# Patient Record
Sex: FEMALE | Race: OTHER
[De-identification: names, ages, dates, MRNs, and addresses within clinical notes are randomized per-mention and may not be internally consistent; named-entity substitution may affect disease eponyms.]

---

## 2017-10-09 ENCOUNTER — HOSPITAL ENCOUNTER (EMERGENCY)
Dept: HOSPITAL 61 - ER | Age: 32
Discharge: HOME | End: 2017-10-09
Payer: SELF-PAY

## 2017-10-09 VITALS — WEIGHT: 168 LBS | HEIGHT: 63 IN | BODY MASS INDEX: 29.77 KG/M2

## 2017-10-09 VITALS — DIASTOLIC BLOOD PRESSURE: 63 MMHG | SYSTOLIC BLOOD PRESSURE: 115 MMHG

## 2017-10-09 DIAGNOSIS — R00.0: ICD-10-CM

## 2017-10-09 DIAGNOSIS — J45.901: Primary | ICD-10-CM

## 2017-10-09 LAB
ALBUMIN SERPL-MCNC: 3.6 G/DL (ref 3.4–5)
ALBUMIN/GLOB SERPL: 0.9 {RATIO} (ref 1–1.7)
ALP SERPL-CCNC: 95 U/L (ref 46–116)
ALT SERPL-CCNC: 30 U/L (ref 14–59)
ANION GAP SERPL CALC-SCNC: 7 MMOL/L (ref 6–14)
AST SERPL-CCNC: 17 U/L (ref 15–37)
BASOPHILS # BLD AUTO: 0.1 X10^3/UL (ref 0–0.2)
BASOPHILS NFR BLD AUTO: 1 % (ref 0–3)
BASOPHILS NFR BLD: 1 % (ref 0–3)
BILIRUB SERPL-MCNC: 0.2 MG/DL (ref 0.2–1)
BUN SERPL-MCNC: 9 MG/DL (ref 7–20)
BUN/CREAT SERPL: 11 (ref 6–20)
CALCIUM SERPL-MCNC: 8.5 MG/DL (ref 8.5–10.1)
CHLORIDE SERPL-SCNC: 106 MMOL/L (ref 98–107)
CO2 SERPL-SCNC: 26 MMOL/L (ref 21–32)
CREAT SERPL-MCNC: 0.8 MG/DL (ref 0.6–1)
EOSINOPHIL NFR BLD AUTO: 2 % (ref 0–5)
EOSINOPHIL NFR BLD: 1 % (ref 0–3)
ERYTHROCYTE [DISTWIDTH] IN BLOOD BY AUTOMATED COUNT: 13.1 % (ref 11.5–14.5)
GFR SERPLBLD BASED ON 1.73 SQ M-ARVRAT: 82.1 ML/MIN
GLOBULIN SER-MCNC: 4.1 G/DL (ref 2.2–3.8)
GLUCOSE SERPL-MCNC: 109 MG/DL (ref 70–99)
HCT VFR BLD CALC: 46.4 % (ref 36–47)
HGB BLD-MCNC: 15.7 G/DL (ref 12–15.5)
LYMPHOCYTES # BLD: 1.6 X10^3/UL (ref 1–4.8)
LYMPHOCYTES NFR BLD AUTO: 9 % (ref 24–48)
MCH RBC QN AUTO: 28 PG (ref 25–35)
MCHC RBC AUTO-ENTMCNC: 34 G/DL (ref 31–37)
MCV RBC AUTO: 83 FL (ref 79–100)
MONOCYTES NFR BLD: 5 % (ref 0–9)
NEUTROPHILS NFR BLD AUTO: 85 % (ref 31–73)
PLATELET # BLD AUTO: 330 X10^3/UL (ref 140–400)
PLATELET # BLD EST: ADEQUATE 10*3/UL
POTASSIUM SERPL-SCNC: 3.5 MMOL/L (ref 3.5–5.1)
PROT SERPL-MCNC: 7.7 G/DL (ref 6.4–8.2)
RBC # BLD AUTO: 5.62 X10^6/UL (ref 3.5–5.4)
SODIUM SERPL-SCNC: 139 MMOL/L (ref 136–145)
TOXIC GRANULES BLD QL SMEAR: SLIGHT
WBC # BLD AUTO: 17.6 X10^3/UL (ref 4–11)

## 2017-10-09 PROCEDURE — 80053 COMPREHEN METABOLIC PANEL: CPT

## 2017-10-09 PROCEDURE — 94640 AIRWAY INHALATION TREATMENT: CPT

## 2017-10-09 PROCEDURE — 93005 ELECTROCARDIOGRAM TRACING: CPT

## 2017-10-09 PROCEDURE — 71020: CPT

## 2017-10-09 PROCEDURE — 94644 CONT INHLJ TX 1ST HOUR: CPT

## 2017-10-09 PROCEDURE — 96361 HYDRATE IV INFUSION ADD-ON: CPT

## 2017-10-09 PROCEDURE — 36415 COLL VENOUS BLD VENIPUNCTURE: CPT

## 2017-10-09 PROCEDURE — 96360 HYDRATION IV INFUSION INIT: CPT

## 2017-10-09 PROCEDURE — 85025 COMPLETE CBC W/AUTO DIFF WBC: CPT

## 2017-10-09 PROCEDURE — 85007 BL SMEAR W/DIFF WBC COUNT: CPT

## 2017-10-09 PROCEDURE — 99285 EMERGENCY DEPT VISIT HI MDM: CPT

## 2017-10-09 NOTE — PHYS DOC
Past Medical History


Past Medical History:  Asthma


Past Surgical History:  Appendectomy


Alcohol Use:  None


Drug Use:  None





Adult General


Chief Complaint


Chief Complaint:  SHORTNESS OF BREATH





HPI


HPI





Patient is a 32 year old female who presents with shortness of breath.  The 

patient reports asthma exacerbation since yesterday with increased dry cough & 

wheezing typical of her usual asthma exacerbation.  She denies fevers/chills, 

chest pain, lower extremity pain/swelling.  She has been using home inhalers 

without relief of symptoms.  She has history of asthma, no previous hospital 

admissions.  Denies any other known past medical history.  Nonsmoker.





Review of Systems


Review of Systems


Constitutional: Denies fever or chills 


Eyes: Denies change in visual acuity


HENT: Denies nasal congestion or sore throat 


Respiratory: Reports cough and shortness of breath 


Cardiovascular:  Denies chest pain or edema


GI: Denies abdominal pain, nausea, vomiting


Musculoskeletal: Denies back pain or joint pain 


Integument: Denies rash or skin lesions 


Neurologic: Denies headache, focal weakness or sensory changes





Current Medications


Current Medications





Current Medications








 Medications


  (Trade)  Dose


 Ordered  Sig/Wander  Start Time


 Stop Time Status Last Admin


Dose Admin


 


 Albuterol Sulfate


  (Ventolin Neb


 Soln)  10 mg  1X  ONCE  10/9/17 14:30


 10/9/17 14:31 DC 10/9/17 14:53


10 MG


 


 Albuterol/


 Ipratropium


  (Duoneb)  3 ml  1X  ONCE  10/9/17 13:15


 10/9/17 13:16 DC 10/9/17 13:22


3 ML


 


 Prednisone


  (Prednisone)  50 mg  1X  ONCE  10/9/17 13:15


 10/9/17 13:16 DC 10/9/17 13:28


50 MG


 


 Sodium Chloride  1,000 ml @ 


 1,000 mls/hr  1X  ONCE  10/9/17 16:45


 10/9/17 17:44 DC 10/9/17 16:45


1,000 MLS/HR











Allergies


Allergies





Allergies








Coded Allergies Type Severity Reaction Last Updated Verified


 


  No Known Drug Allergies    11/4/14 No











Physical Exam


Physical Exam


Constitutional: Well developed, well nourished, mild distress


HENT: Normocephalic, atraumatic, bilateral external ears normal, oropharynx 

moist, no tonsillar swelling or exudate, nose normal.


Eyes: conjunctiva normal, no discharge.


Neck: supple, no stridor.


Cardiovascular: Tachycardic, regular, no murmurs, no edema. 


Lungs & Thorax: Tight throughout, expiratory wheezing present, mild respiratory 

distress, no respiratory distress.


Abdomen: soft, nontender, nondistended.


Skin: Warm, dry, no erythema, no rash.


Back: No tenderness.


Extremities: No tenderness, no edema. No calf tenderness or swelling


Neurologic: Alert and oriented X 3, no focal deficits noted. 


Psychologic: Affect normal, judgement normal, mood normal.





Current Patient Data


Vital Signs





 Vital Signs








  Date Time  Temp Pulse Resp B/P (MAP) Pulse Ox O2 Delivery O2 Flow Rate FiO2


 


10/9/17 17:39  115 42 115/63 (80) 99   


 


10/9/17 13:09      Room Air  


 


10/9/17 13:05 97.7       





 97.7       








Lab Values





 Laboratory Tests








Test


  10/9/17


14:30


 


White Blood Count


  17.6 x10^3/uL


(4.0-11.0)  H


 


Red Blood Count


  5.62 x10^6/uL


(3.50-5.40)  H


 


Hemoglobin


  15.7 g/dL


(12.0-15.5)  H


 


Hematocrit


  46.4 %


(36.0-47.0)


 


Mean Corpuscular Volume


  83 fL ()


 


 


Mean Corpuscular Hemoglobin 28 pg (25-35)  


 


Mean Corpuscular Hemoglobin


Concent 34 g/dL


(31-37)


 


Red Cell Distribution Width


  13.1 %


(11.5-14.5)


 


Platelet Count


  330 x10^3/uL


(140-400)


 


Neutrophils (%) (Auto) 85 % (31-73)  H


 


Lymphocytes (%) (Auto) 9 % (24-48)  L


 


Monocytes (%) (Auto) 5 % (0-9)  


 


Eosinophils (%) (Auto) 1 % (0-3)  


 


Basophils (%) (Auto) 1 % (0-3)  


 


Neutrophils # (Auto)


  15.0 x10^3uL


(1.8-7.7)  H


 


Lymphocytes # (Auto)


  1.6 x10^3/uL


(1.0-4.8)


 


Monocytes # (Auto)


  0.8 x10^3/uL


(0.0-1.1)


 


Eosinophils # (Auto)


  0.2 x10^3/uL


(0.0-0.7)


 


Basophils # (Auto)


  0.1 x10^3/uL


(0.0-0.2)


 


Segmented Neutrophils % 68 % (35-66)  H


 


Band Neutrophils % 21 % (0-9)  H


 


Lymphocytes % 7 % (24-48)  L


 


Monocytes % 1 % (0-10)  


 


Eosinophils % 2 % (0-5)  


 


Basophils % 1 % (0-3)  


 


Toxic Granulation Slight  


 


Platelet Estimate


  Adequate


(ADEQUATE)


 


Sodium Level


  139 mmol/L


(136-145)


 


Potassium Level


  3.5 mmol/L


(3.5-5.1)


 


Chloride Level


  106 mmol/L


()


 


Carbon Dioxide Level


  26 mmol/L


(21-32)


 


Anion Gap 7 (6-14)  


 


Blood Urea Nitrogen


  9 mg/dL (7-20)


 


 


Creatinine


  0.8 mg/dL


(0.6-1.0)


 


Estimated GFR


(Cockcroft-Gault) 82.1  


 


 


BUN/Creatinine Ratio 11 (6-20)  


 


Glucose Level


  109 mg/dL


(70-99)  H


 


Calcium Level


  8.5 mg/dL


(8.5-10.1)


 


Total Bilirubin


  0.2 mg/dL


(0.2-1.0)


 


Aspartate Amino Transferase


(AST) 17 U/L (15-37)


 


 


Alanine Aminotransferase (ALT)


  30 U/L (14-59)


 


 


Alkaline Phosphatase


  95 U/L


()


 


Total Protein


  7.7 g/dL


(6.4-8.2)


 


Albumin


  3.6 g/dL


(3.4-5.0)


 


Albumin/Globulin Ratio


  0.9 (1.0-1.7)


L





 Laboratory Tests


10/9/17 14:30








 Laboratory Tests


10/9/17 14:30














EKG


EKG


interpreted by me:  sinus tachycardia rate 122, no acute St/T wave changes, 

normal intervals, no ectopy.[]





Radiology/Procedures


Radiology/Procedures


PROCEDURE: CHEST PA & LATERAL








 Exam performed: 2 views of the chest. 





Indication: Shortness of breath





Date of Service:10/9/2017 4:03 PM . Comparison : Delay chest from 01/30/12.





Findings:





PA and lateral radiographs of the chest reveal a normal cardiomediastinal


contour. The lungs are clear. No pleural fluid is seen.  The visualized


osseous structures are unremarkable.





Impression: Radiographically normal chest.

















DICTATED and SIGNED BY:     EVELYN WELLS MD


DATE:     10/09/17 1437


[]





Course & Med Decision Making


Course & Med Decision Making


Pertinent Labs and Imaging studies reviewed. (See chart for details)


The patient presents with asthma exacerbation. Administered prednisone and 

breathing treatments. Her breathing significantly improved but she had some 

persistent wheezing. She was also persistently tachycardic into the 130s. Gave 

IV fluids, obtained labs and chest x-ray. Administered hour-long albuterol 

treatment. She felt much improved after completing continuous albuterol and 

requested discharge home. Breath sounds were clear and she was resting 

comfortably. However she was still tachycardic into the 130s. Likely iatrogenic 

from administration of large amount of albuterol, but persistently tachycardic 

with prolonged period of observation. Very low suspicion for PE as symptoms 

improved with treatment of asthma and no significant risk factors. Provided 

second liter of IV fluids. Heart rate improving to 110 and she had no 

persistent symptoms. She was not febrile, no evidence of pneumonia on chest x-

ray. The patient was comfortable with discharge home. Recommend rest, hydration

, Tylenol or ibuprofen for pain or fever, gave prednisone prescription, 

continue albuterol. Follow-up with primary care physician in 2-3 days. Return 

to the emergency department for severe shortness of breath or chest pain, or 

any otherwise worsening condition. Discharged home in stable and improved 

condition.


[]





Dragon Disclaimer


Dragon Disclaimer


This electronic medical record was generated, in whole or in part, using a 

voice recognition dictation system.





Departure


Departure


Impression:  


 Primary Impression:  


 Asthma exacerbation


 Additional Impression:  


 Tachycardia


Disposition:  01 HOME, SELF-CARE


Condition:  STABLE


Referrals:  


UNKNOWN PCP NAME (PCP)








JOAQUIN MENDEZ MD


Patient Instructions:  Asthma, Adult, Easy-to-Read





Additional Instructions:  


You were seen in the emergency department today for asthma exacerbation.  You 

improved with breathing treatments.  Your x-ray did not show pneumonia.  Please 

rest, drink fluids, take tylenol or ibuprofen for pain or fever, use prednisone 

as prescribed, continue using inhalers.  Come back for severe shortness of 

breath or chest pain, or any otherwise worsening condition.


Scripts


Prednisone (PREDNISONE) 50 Mg Tablet


1 TAB PO DAILY, #5 TAB


   Prov: JERMAINE BARROSO MD         10/9/17





Problem Qualifiers











JERMAINE BARROSO MD Oct 9, 2017 17:38

## 2017-10-09 NOTE — RAD
Exam performed: 2 views of the chest. 



Indication: Shortness of breath



Date of Service:10/9/2017 4:03 PM . Comparison : Delay chest from 01/30/12.



Findings:



PA and lateral radiographs of the chest reveal a normal cardiomediastinal

contour. The lungs are clear. No pleural fluid is seen.  The visualized

osseous structures are unremarkable.



Impression: Radiographically normal chest.

## 2017-10-09 NOTE — EKG
[facility]

               [address] [place] [address]

Test Date:    2017-10-09               Test Time:    14:39:08

Pat Name:     TIA SAWYER    Department:   

Patient ID:   [id number]           Room:          

Gender:       F                        Technician:   

:          [id number]               Requested By: JERMAINE BARROSO

Order Number: [id number]            Reading MD:   Micki Crowley

                                 Measurements

Intervals                              Axis          

Rate:         122                      P:            -90

NJ:           98                       QRS:          54

QRSD:         92                       T:            31

QT:           320                                    

QTc:          457                                    

                           Interpretive Statements

SINUS TACHYCARDIA

OTHERWISE NORMAL ECG





Electronically Signed On 10-9-2017 19:09:50 CDT by Micki Crowley

## 2018-05-27 ENCOUNTER — HOSPITAL ENCOUNTER (EMERGENCY)
Dept: HOSPITAL 61 - ER | Age: 33
LOS: 1 days | Discharge: HOME | End: 2018-05-28
Payer: SELF-PAY

## 2018-05-27 DIAGNOSIS — R07.9: Primary | ICD-10-CM

## 2018-05-27 DIAGNOSIS — J45.909: ICD-10-CM

## 2018-05-27 DIAGNOSIS — Z90.49: ICD-10-CM

## 2018-05-27 DIAGNOSIS — R11.0: ICD-10-CM

## 2018-05-27 DIAGNOSIS — Z79.899: ICD-10-CM

## 2018-05-27 PROCEDURE — 87086 URINE CULTURE/COLONY COUNT: CPT

## 2018-05-27 PROCEDURE — 82550 ASSAY OF CK (CPK): CPT

## 2018-05-27 PROCEDURE — 99285 EMERGENCY DEPT VISIT HI MDM: CPT

## 2018-05-27 PROCEDURE — 93005 ELECTROCARDIOGRAM TRACING: CPT

## 2018-05-27 PROCEDURE — 85025 COMPLETE CBC W/AUTO DIFF WBC: CPT

## 2018-05-27 PROCEDURE — 36415 COLL VENOUS BLD VENIPUNCTURE: CPT

## 2018-05-27 PROCEDURE — 81025 URINE PREGNANCY TEST: CPT

## 2018-05-27 PROCEDURE — 85379 FIBRIN DEGRADATION QUANT: CPT

## 2018-05-27 PROCEDURE — 80053 COMPREHEN METABOLIC PANEL: CPT

## 2018-05-27 PROCEDURE — 71045 X-RAY EXAM CHEST 1 VIEW: CPT

## 2018-05-27 PROCEDURE — 81001 URINALYSIS AUTO W/SCOPE: CPT

## 2018-05-28 LAB
ADD MAN DIFF?: NO
ALBUMIN SERPL-MCNC: 3.5 G/DL (ref 3.4–5)
ALBUMIN/GLOB SERPL: 0.9 {RATIO} (ref 1–1.7)
ALP SERPL-CCNC: 107 U/L (ref 46–116)
ALT (SGPT): 25 U/L (ref 14–59)
ANION GAP SERPL CALC-SCNC: 11 MMOL/L (ref 6–14)
AST SERPL-CCNC: 14 U/L (ref 15–37)
BACTERIA,URINE: (no result) /HPF
BASO #: 0.2 X10^3/UL (ref 0–0.2)
BASO %: 1 % (ref 0–3)
BILIRUBIN,URINE: NEGATIVE
BLOOD UREA NITROGEN: 12 MG/DL (ref 7–20)
BUN/CREAT SERPL: 13 (ref 6–20)
CALCIUM: 8.8 MG/DL (ref 8.5–10.1)
CHLORIDE: 109 MMOL/L (ref 98–107)
CK SERPL-CCNC: 67 U/L (ref 26–192)
CLARITY,URINE: CLEAR
CO2 SERPL-SCNC: 22 MMOL/L (ref 21–32)
COLOR,URINE: COLORLESS
CREAT SERPL-MCNC: 0.9 MG/DL (ref 0.6–1)
D-DIMER: < 0.27 UG/MLFEU (ref 0–0.5)
EOS #: 0.5 X10^3/UL (ref 0–0.7)
EOS %: 3 % (ref 0–3)
GFR SERPLBLD BASED ON 1.73 SQ M-ARVRAT: 71.7 ML/MIN
GLOBULIN SER-MCNC: 4.1 G/DL (ref 2.2–3.8)
GLUCOSE SERPL-MCNC: 99 MG/DL (ref 70–99)
GLUCOSE,URINE: NEGATIVE MG/DL
HCG SERPL-ACNC: 14.1 X10^3/UL (ref 4–11)
HEMATOCRIT: 45.9 % (ref 36–47)
HEMOGLOBIN: 15.5 G/DL (ref 12–15.5)
LYMPH #: 3 X10^3/UL (ref 1–4.8)
LYMPH %: 22 % (ref 24–48)
MEAN CORPUSCULAR HEMOGLOBIN: 29 PG (ref 25–35)
MEAN CORPUSCULAR HGB CONC: 34 G/DL (ref 31–37)
MEAN CORPUSCULAR VOLUME: 84 FL (ref 79–100)
MONO #: 0.7 X10^3/UL (ref 0–1.1)
MONO %: 5 % (ref 0–9)
NEUT #: 9.7 X10^3UL (ref 1.8–7.7)
NEUT %: 69 % (ref 31–73)
NITRITE,URINE: NEGATIVE
PH,URINE: 7
PLATELET COUNT: 369 X10^3/UL (ref 140–400)
POTASSIUM SERPL-SCNC: 3.7 MMOL/L (ref 3.5–5.1)
PROTEIN,URINE: NEGATIVE MG/DL
RBC,URINE: (no result) /HPF (ref 0–2)
RED BLOOD COUNT: 5.45 X10^6/UL (ref 3.5–5.4)
RED CELL DISTRIBUTION WIDTH: 13.1 % (ref 11.5–14.5)
SODIUM: 142 MMOL/L (ref 136–145)
SPECIFIC GRAVITY,URINE: <=1.005
SQUAMOUS EPITHELIAL CELL,UR: (no result) /LPF
TOTAL BILIRUBIN: 0.2 MG/DL (ref 0.2–1)
TOTAL PROTEIN: 7.6 G/DL (ref 6.4–8.2)
URINE HCG POC: (no result)
UROBILINOGEN,URINE: 0.2 MG/DL

## 2018-05-28 RX ADMIN — BACITRACIN 1 MLS/HR: 5000 INJECTION, POWDER, FOR SOLUTION INTRAMUSCULAR at 00:15

## 2019-03-07 ENCOUNTER — HOSPITAL ENCOUNTER (EMERGENCY)
Dept: HOSPITAL 61 - ER | Age: 34
LOS: 1 days | Discharge: HOME | End: 2019-03-08
Payer: SELF-PAY

## 2019-03-07 VITALS
WEIGHT: 170 LBS | DIASTOLIC BLOOD PRESSURE: 76 MMHG | SYSTOLIC BLOOD PRESSURE: 135 MMHG | HEIGHT: 63 IN | BODY MASS INDEX: 30.12 KG/M2

## 2019-03-07 DIAGNOSIS — R00.0: ICD-10-CM

## 2019-03-07 DIAGNOSIS — J45.901: Primary | ICD-10-CM

## 2019-03-07 DIAGNOSIS — Z90.89: ICD-10-CM

## 2019-03-07 PROCEDURE — 94640 AIRWAY INHALATION TREATMENT: CPT

## 2019-03-07 PROCEDURE — 99283 EMERGENCY DEPT VISIT LOW MDM: CPT

## 2019-03-07 PROCEDURE — 96372 THER/PROPH/DIAG INJ SC/IM: CPT

## 2019-03-07 NOTE — PHYS DOC
Past Medical History


Past Medical History:  Asthma


 (SUSAN WINN)


Past Surgical History:  Appendectomy


 (SUSAN WINN)


Alcohol Use:  None


Drug Use:  None


 (SUSAN WINN)





Adult General


Chief Complaint


Chief Complaint:  ASTHMA





HPI


HPI





Patient is a 33 -year-old female with a history of asthma presents to the ED 

complaining of cough around 1:00 today. States she had to use her inhaler 4 

times in the last couple of hours. Associated symptoms include sore throat. 

Denies chest pain, weakness, rash, nausea/vomiting, palpitations, back pain, 

neck pain, vision changes, fever or dizziness.


 (SUSAN WINN)





Review of Systems


Review of Systems





Constitutional: Denies fever or chills []


Eyes: Denies change in visual acuity, redness, or eye pain []


HENT: Complains of sore throat. Denies nasal congestion. []


Respiratory: Complains of cough and shortness of breath.


Cardiovascular: No additional information not addressed in HPI []


GI: Denies abdominal pain, nausea, vomiting, bloody stools or diarrhea []


: Denies dysuria or hematuria []


Musculoskeletal: Denies back pain or joint pain []


Integument: Denies rash or skin lesions []


Neurologic: Denies headache, focal weakness or sensory changes []





All other systems were reviewed and found to be within normal limits, except as 

documented in this note.


 (SUSAN WINN)





Current Medications


Current Medications





Current Medications








 Medications


  (Trade)  Dose


 Ordered  Sig/Wander  Start Time


 Stop Time Status Last Admin


Dose Admin


 


 Albuterol/


 Ipratropium


  (Duoneb)  3 ml  1X  ONCE  3/7/19 23:00


 3/7/19 23:01 DC 3/7/19 23:50


3 ML


 


 Methylprednisolone


 Sodium Succinate


  (SOLU-Medrol


 125MG VIAL)  125 mg  1X  ONCE  3/7/19 23:00


 3/7/19 23:01 DC 3/7/19 23:46


125 MG





 (ELLEN STRONG MD)





Allergies


Allergies





Allergies








Coded Allergies Type Severity Reaction Last Updated Verified


 


  No Known Drug Allergies    11/4/14 No





 (ELLEN STRONG MD)





Physical Exam


Physical Exam





Constitutional: Well developed, well nourished, no acute distress, non-toxic 

appearance. []


HENT: Normocephalic, atraumatic, bilateral external ears normal, oropharynx 

moist, no oral exudates, nose normal. []


Eyes: PERRLA, EOMI, conjunctiva normal, no discharge. [] 


Neck: Normal range of motion, no tenderness, supple, no stridor. [] 


Cardiovascular:Tachycardic, Normal rhythm, no murmur []


Lungs & Thorax:  Mild wheezing bilaterally. 


Abdomen: Bowel sounds normal, soft, no tenderness, no masses, no pulsatile 

masses. [] 


Skin: Warm, dry, no erythema, no rash. [] 


Back: No tenderness, no CVA tenderness. [] 


Extremities: No tenderness, no cyanosis, no clubbing, ROM intact, no edema. [] 


Neurologic: Alert and oriented X 3, normal motor function, normal sensory 

function, no focal deficits noted. []


Psychologic: Affect normal, judgement normal, mood normal. []


 (SUSAN WINN)





Current Patient Data


Vital Signs





 Vital Signs








  Date Time  Temp Pulse Resp B/P (MAP) Pulse Ox O2 Delivery O2 Flow Rate FiO2


 


3/7/19 23:50     96   


 


3/7/19 22:29 97.9 120 20 135/76 (95)  Room Air  





 97.9       





 (ELLEN STRONG MD)





EKG


EKG


[]


 (SUSAN WINN)





Radiology/Procedures


Radiology/Procedures


[]


 (SUSAN WINN)





Course & Med Decision Making


Course & Med Decision Making


Pertinent Labs and Imaging studies reviewed. (See chart for details)





[]Patient improved after breathing treatment. On re-examination, patient is not 

tachycardic, not tachypneic and O2 saturation is 98%. Patient states she is 

feeling much better. Will treat with short course of prednisone outpatient. 

Patient has an inhaler at home. Discussed follow-up with PCP this week. 

Provided contact information/education. Discussed reasons to return to the ED. 

Patient understands and agrees with plan. Family at bedside.


 (SUSAN WINN)


Course & Med Decision Making





Staff Physician Addendum:


I was working in the ER during the course of this patient's visit.  I was 

available for consultation as needed, but I was not directly involved in the 

care of this patient.    


 (ELLEN STRONG MD)


Dragon Disclaimer


Dragon Disclaimer


This electronic medical record was generated, in whole or in part, using a 

voice recognition dictation system.


 (SUSAN WINN)





Departure


Departure


Impression:  


 Primary Impression:  


 Asthma exacerbation


Disposition:  01 HOME, SELF-CARE


Condition:  IMPROVED


Referrals:  


ALYX LARSEN (PCP)


Patient Instructions:  Asthma, Adult


Scripts


Prednisone (PREDNISONE) 20 Mg Tablet


2 TAB PO DAILY for 5 Days, #10 TAB


   Prov: SUSAN WINN         3/8/19











SUSAN WINN Mar 7, 2019 22:59


ELLEN STRONG MD Mar 9, 2019 05:31

## 2019-03-09 ENCOUNTER — HOSPITAL ENCOUNTER (EMERGENCY)
Dept: HOSPITAL 61 - ER | Age: 34
Discharge: HOME | End: 2019-03-09
Payer: SELF-PAY

## 2019-03-09 VITALS — HEIGHT: 63 IN | BODY MASS INDEX: 30.12 KG/M2 | WEIGHT: 170 LBS

## 2019-03-09 VITALS — DIASTOLIC BLOOD PRESSURE: 77 MMHG | SYSTOLIC BLOOD PRESSURE: 115 MMHG

## 2019-03-09 DIAGNOSIS — Z90.89: ICD-10-CM

## 2019-03-09 DIAGNOSIS — J45.21: Primary | ICD-10-CM

## 2019-03-09 PROCEDURE — 94640 AIRWAY INHALATION TREATMENT: CPT

## 2019-03-09 PROCEDURE — 99283 EMERGENCY DEPT VISIT LOW MDM: CPT

## 2019-03-09 NOTE — PHYS DOC
Past Medical History


Past Medical History:  Asthma


Past Surgical History:  Appendectomy


Alcohol Use:  None


Drug Use:  None





Adult General


Chief Complaint


Chief Complaint:  ASTHMA





HPI


HPI





Patient is a 33  year old female who presents with cough and congestion. She 

reports difficulty breathing. She was seen and diagnosed with an asthma 

exacerbation 2 days ago. Reports that the inhalers not working. Increased with 

coughing. She has not seen her primary care physician for follow-up at this 

time. Denies any fever. Denies any leg swelling. Reports minimal improvement 

with her metered-dose inhaler.[]





Review of Systems


Review of Systems





Constitutional: Denies fever or chills []


Eyes: Denies change in visual acuity, redness, or eye pain []


HENT: Denies sore throat, see history of present illness []


Respiratory: See history of present illness[]


Cardiovascular: No chest pain or palpitations[]


GI: Denies abdominal pain, nausea, vomiting, bloody stools or diarrhea []


: Denies dysuria or hematuria []


Musculoskeletal: Denies back pain or joint pain []


Integument: Denies rash or skin lesions []


Neurologic: Denies headache, focal weakness or sensory changes []


Endocrine: Denies polyuria or polydipsia []





All other systems were reviewed and found to be within normal limits, except as 

documented in this note.





Current Medications


Current Medications





Current Medications








 Medications


  (Trade)  Dose


 Ordered  Sig/Wander  Start Time


 Stop Time Status Last Admin


Dose Admin


 


 Albuterol/


 Ipratropium


  (Duoneb)  3 ml  1X  ONCE  3/9/19 07:15


 3/9/19 07:16 DC 3/9/19 07:29


3 ML











Allergies


Allergies





Allergies








Coded Allergies Type Severity Reaction Last Updated Verified


 


  No Known Drug Allergies    11/4/14 No











Physical Exam


Physical Exam





Constitutional: Well developed, well nourished, no acute distress, non-toxic 

appearance. []


HENT: Normocephalic, atraumatic, bilateral external ears normal, oropharynx 

moist, no oral exudates, nose with clear rhinorrhea, posterior oral pharyngeal 

streaking. []


Eyes: PERRLA, EOMI, conjunctiva normal, no discharge. [] 


Neck: Normal range of motion, no tenderness, supple, no stridor. [] 


Cardiovascular:Heart rate regular rhythm, no murmur []


Lungs & Thorax: Scattered expiratory wheezes[]


Abdomen: Bowel sounds normal, soft, no tenderness, no masses, no pulsatile 

masses. [] 


Skin: Warm, dry, no erythema, no rash. [] 


Back: No tenderness, no CVA tenderness. [] 


Extremities: No tenderness, no cyanosis, no clubbing, ROM intact, no edema. [] 


Neurologic: Alert and oriented X 3, normal motor function, normal sensory 

function, no focal deficits noted. []


Psychologic: Affect normal, judgement normal, mood normal. []





Current Patient Data


Vital Signs





 Vital Signs








  Date Time  Temp Pulse Resp B/P (MAP) Pulse Ox O2 Delivery O2 Flow Rate FiO2


 


3/9/19 07:31      Room Air  


 


3/9/19 07:09 97.9 117 24 115/77 (90) 96   





 97.9       











EKG


EKG


[]





Radiology/Procedures


Radiology/Procedures


[]





Course & Med Decision Making


Course & Med Decision Making


Pertinent Labs and Imaging studies reviewed. (See chart for details)





ED course and medical decision making: Patient appears to have an upper 

respiratory infection triggering asthma exacerbation. Her oxygen saturation was 

96% at the start, prior to any breathing treatments. She received a DuoNeb 

which cleared her lung sounds and her oxygen saturation was 98% after the 

breathing treatment. Will add ipratropium to her regimen along with cough and 

congestion medicine.[]





Dragon Disclaimer


Dragon Disclaimer


This electronic medical record was generated, in whole or in part, using a 

voice recognition dictation system.





Departure


Departure


Impression:  


 Primary Impression:  


 Asthma exacerbation


 Additional Impression:  


 Upper respiratory infection


Disposition:  01 HOME, SELF-CARE


Condition:  IMPROVED


Referrals:  


ALYX LARSEN (PCP)


Follow-up in 2 days


Patient Instructions:  Asthma Attacks, Prevention, Asthma, Adult, Upper 

Respiratory Infection, Adult





Additional Instructions:  


Drink plenty of fluids. Follow-up with your regular doctor in 2 days. Return to 

the ER if worsening difficulty breathing or any other concerns.


Scripts


D-Methorphan Hb/Prometh Hcl (PROMETHAZINE-DM SYRUP) 118 Ml Syrup


5 ML PO PRN Q4HRS, #120 ML


   Prov: TELLO URBINA DO         3/9/19 


Ipratropium Bromide (ATROVENT HFA) 12.9 Gm Hfa.aer.ad


12.9 GM IH Q6HRS, #1 INHALER


   Prov: TELLO URBINA DO         3/9/19





Problem Qualifiers








 Primary Impression:  


 Asthma exacerbation


 Asthma severity:  unspecified severity  Asthma persistence:  intermittent  

Qualified Codes:  J45.21 - Mild intermittent asthma with (acute) exacerbation


 Additional Impression:  


 Upper respiratory infection


 URI type:  unspecified URI  Qualified Codes:  J06.9 - Acute upper respiratory 

infection, unspecified








TELLO URBINA DO Mar 9, 2019 07:17

## 2019-10-03 ENCOUNTER — HOSPITAL ENCOUNTER (EMERGENCY)
Dept: HOSPITAL 61 - ER | Age: 34
Discharge: HOME | End: 2019-10-03
Payer: SELF-PAY

## 2019-10-03 VITALS — WEIGHT: 165 LBS | HEIGHT: 63 IN | BODY MASS INDEX: 29.23 KG/M2

## 2019-10-03 VITALS — SYSTOLIC BLOOD PRESSURE: 140 MMHG | DIASTOLIC BLOOD PRESSURE: 79 MMHG

## 2019-10-03 DIAGNOSIS — J45.901: Primary | ICD-10-CM

## 2019-10-03 PROCEDURE — 94640 AIRWAY INHALATION TREATMENT: CPT

## 2019-10-03 PROCEDURE — 99285 EMERGENCY DEPT VISIT HI MDM: CPT

## 2019-10-03 PROCEDURE — 94644 CONT INHLJ TX 1ST HOUR: CPT

## 2019-10-03 NOTE — PHYS DOC
Past Medical History


Past Medical History:  Asthma


Past Surgical History:  Appendectomy


Alcohol Use:  None


Drug Use:  None





Adult General


Chief Complaint


Chief Complaint:  ASTHMA





HPI


HPI





Patient is a 34  year old female that presents with cough and shortness of 

breath is ongoing since last night around 9:00 PM. Patient states she's having 3

out of 10 pain when she takes a deep breath. She states that she has a history 

of asthma has been using an inhaler at home that is not working.





Review of Systems


Review of Systems





Constitutional: Denies fever or chills []


Eyes: Denies change in visual acuity, redness, or eye pain []


HENT: Reports nasal congestion or sore throat []


Respiratory: Reports cough or shortness of breath []


Cardiovascular: No additional information not addressed in HPI []


GI: Denies abdominal pain, nausea, vomiting, bloody stools or diarrhea []


: Denies dysuria or hematuria []


Musculoskeletal: Denies back pain or joint pain []


Integument: Denies rash or skin lesions []


Neurologic: Denies headache, focal weakness or sensory changes []


Endocrine: Denies polyuria or polydipsia []





Complete systems were reviewed and found to be within normal limits, except as 

documented in this note.





Current Medications


Current Medications





Current Medications








 Medications


  (Trade)  Dose


 Ordered  Sig/Wander  Start Time


 Stop Time Status Last Admin


Dose Admin


 


 Albuterol Sulfate


  (Ventolin Neb


 Soln)  10 mg  1X  STAT  10/3/19 10:15


 10/3/19 10:17 DC 10/3/19 10:27


10 MG


 


 Albuterol/


 Ipratropium


  (Duoneb)  3 ml  1X  ONCE  10/3/19 09:30


 10/3/19 09:31 DC 10/3/19 09:34


3 ML


 


 Methylprednisolone


 Sodium Succinate


  (SOLU-Medrol


 125MG VIAL)  125 mg  1X  ONCE  10/3/19 09:30


 10/3/19 09:31 DC 10/3/19 09:39


125 MG











Allergies


Allergies





Allergies








Coded Allergies Type Severity Reaction Last Updated Verified


 


  No Known Drug Allergies    11/4/14 No











Physical Exam


Physical Exam





Constitutional: Well developed, well nourished, no acute distress, non-toxic 

appearance. []


HENT: Normocephalic, atraumatic, bilateral external ears normal, oropharynx 

moist, no oral exudates, nose normal. []


Eyes: PERRLA, EOMI, conjunctiva normal, no discharge. [] 


Neck: Normal range of motion, no tenderness, supple, no stridor. [] 


Cardiovascular:Heart rate regular rhythm, no murmur []


Lungs & Thorax:  Bilateral breath sounds have wheezing diffusely.


Abdomen: Bowel sounds normal, soft, no tenderness, no masses, no pulsatile 

masses. [] 


Skin: Warm, dry, no erythema, no rash. [] 


Back: No tenderness, no CVA tenderness. [] 


Extremities: No tenderness, no cyanosis, no clubbing, ROM intact, no edema. [] 


Neurologic: Alert and oriented X 3, normal motor function, normal sensory 

function, no focal deficits noted. []


Psychologic: Affect normal, judgement normal, mood normal. []





Current Patient Data


Vital Signs





                                   Vital Signs








  Date Time  Temp Pulse Resp B/P (MAP) Pulse Ox O2 Delivery O2 Flow Rate FiO2


 


10/3/19 10:33      Room Air  


 


10/3/19 09:03 97.8 130 30 140/79 (99) 93   





 97.8       











EKG


EKG


[]





Radiology/Procedures


Radiology/Procedures


[]





Course & Med Decision Making


Course & Med Decision Making


Pertinent Labs and Imaging studies reviewed. (See chart for details)





Patient appears to be having an asthma attack, will give steroids and breathing 

treatment.





After initial duoneb, patient is still feeling better but is still wheezing. 

Will order hour long continuous nebulizer.





After hour long patient has improved. Will d/c home.





Dragon Disclaimer


Dragon Disclaimer


This electronic medical record was generated, in whole or in part, using a voice

 recognition dictation system.





Departure


Departure


Impression:  


   Primary Impression:  


   Asthma exacerbation


Disposition:  01 HOME, SELF-CARE


Condition:  STABLE


Referrals:  


NO PCP (PCP)


Patient Instructions:  Asthma Attacks, Prevention, Asthma, Adult





Additional Instructions:  


Thank you for visiting [facility]. We appreciate you trusting us 

with your care. If any additional problems come up don't hesitate to return to 

visit us. Please follow up with your primary care provider so they can plan 

additional care if needed and know about the problem that you had. If symptoms 

worsen come back to the Emergency Department. Any concerning symptoms that start

 such as chest pain, shortness of air, weakness or numbness on one side of the 

body, running high fevers or any other concerning symptoms return to the ER.





Please follow up with primary care doctor regarding your asthma.


Scripts


Prednisone (PREDNISONE) 20 Mg Tablet


1 TAB PO BID for 5 Days, #10 TAB


   Prov: JESUS SHEPHERD         10/3/19





Problem Qualifiers








   Primary Impression:  


   Asthma exacerbation


   Asthma severity:  moderate  Asthma persistence:  unspecified  Qualified 

   Codes:  J45.901 - Unspecified asthma with (acute) exacerbation








JESUS SHEPHERD           Oct 3, 2019 09:30

## 2019-10-18 ENCOUNTER — HOSPITAL ENCOUNTER (OUTPATIENT)
Dept: HOSPITAL 61 - ER | Age: 34
Setting detail: OBSERVATION
LOS: 1 days | Discharge: HOME | End: 2019-10-19
Attending: INTERNAL MEDICINE | Admitting: INTERNAL MEDICINE
Payer: SELF-PAY

## 2019-10-18 VITALS — DIASTOLIC BLOOD PRESSURE: 78 MMHG | SYSTOLIC BLOOD PRESSURE: 126 MMHG

## 2019-10-18 VITALS — WEIGHT: 161 LBS | BODY MASS INDEX: 28.53 KG/M2 | HEIGHT: 63 IN

## 2019-10-18 DIAGNOSIS — J45.901: Primary | ICD-10-CM

## 2019-10-18 DIAGNOSIS — E66.9: ICD-10-CM

## 2019-10-18 DIAGNOSIS — Z82.49: ICD-10-CM

## 2019-10-18 DIAGNOSIS — Z90.49: ICD-10-CM

## 2019-10-18 LAB
% BANDS: 2 % (ref 0–9)
% LYMPHS: 17 % (ref 24–48)
% MONOS: 1 % (ref 0–10)
% SEGS: 79 % (ref 35–66)
ALBUMIN SERPL-MCNC: 3.2 G/DL (ref 3.4–5)
ALBUMIN/GLOB SERPL: 0.9 {RATIO} (ref 1–1.7)
ALP SERPL-CCNC: 113 U/L (ref 46–116)
ALT SERPL-CCNC: 23 U/L (ref 14–59)
ANION GAP SERPL CALC-SCNC: 12 MMOL/L (ref 6–14)
AST SERPL-CCNC: 19 U/L (ref 15–37)
BASOPHILS # BLD AUTO: 0.1 X10^3/UL (ref 0–0.2)
BASOPHILS NFR BLD: 1 % (ref 0–3)
BILIRUB SERPL-MCNC: 0.3 MG/DL (ref 0.2–1)
BUN SERPL-MCNC: 7 MG/DL (ref 7–20)
BUN/CREAT SERPL: 7 (ref 6–20)
CALCIUM SERPL-MCNC: 8.9 MG/DL (ref 8.5–10.1)
CHLORIDE SERPL-SCNC: 104 MMOL/L (ref 98–107)
CO2 SERPL-SCNC: 24 MMOL/L (ref 21–32)
CREAT SERPL-MCNC: 1 MG/DL (ref 0.6–1)
EOSINOPHIL NFR BLD AUTO: 1 % (ref 0–5)
EOSINOPHIL NFR BLD: 0.5 X10^3/UL (ref 0–0.7)
EOSINOPHIL NFR BLD: 3 % (ref 0–3)
ERYTHROCYTE [DISTWIDTH] IN BLOOD BY AUTOMATED COUNT: 14.6 % (ref 11.5–14.5)
GFR SERPLBLD BASED ON 1.73 SQ M-ARVRAT: 62.7 ML/MIN
GLOBULIN SER-MCNC: 3.7 G/DL (ref 2.2–3.8)
GLUCOSE SERPL-MCNC: 109 MG/DL (ref 70–99)
HCT VFR BLD CALC: 45.7 % (ref 36–47)
HGB BLD-MCNC: 15.1 G/DL (ref 12–15.5)
LYMPHOCYTES # BLD: 4.2 X10^3/UL (ref 1–4.8)
LYMPHOCYTES NFR BLD AUTO: 22 % (ref 24–48)
MCH RBC QN AUTO: 27 PG (ref 25–35)
MCHC RBC AUTO-ENTMCNC: 33 G/DL (ref 31–37)
MCV RBC AUTO: 81 FL (ref 79–100)
MONO #: 0.9 X10^3/UL (ref 0–1.1)
MONOCYTES NFR BLD: 5 % (ref 0–9)
NEUT #: 13.2 X10^3/UL (ref 1.8–7.7)
NEUTROPHILS NFR BLD AUTO: 70 % (ref 31–73)
PLATELET # BLD AUTO: 458 X10^3/UL (ref 140–400)
PLATELET # BLD EST: ADEQUATE 10*3/UL
POTASSIUM SERPL-SCNC: 3.5 MMOL/L (ref 3.5–5.1)
PROT SERPL-MCNC: 6.9 G/DL (ref 6.4–8.2)
RBC # BLD AUTO: 5.63 X10^6/UL (ref 3.5–5.4)
SODIUM SERPL-SCNC: 140 MMOL/L (ref 136–145)
WBC # BLD AUTO: 19 X10^3/UL (ref 4–11)

## 2019-10-18 PROCEDURE — G0378 HOSPITAL OBSERVATION PER HR: HCPCS

## 2019-10-18 PROCEDURE — 96366 THER/PROPH/DIAG IV INF ADDON: CPT

## 2019-10-18 PROCEDURE — 85025 COMPLETE CBC W/AUTO DIFF WBC: CPT

## 2019-10-18 PROCEDURE — 94640 AIRWAY INHALATION TREATMENT: CPT

## 2019-10-18 PROCEDURE — 36415 COLL VENOUS BLD VENIPUNCTURE: CPT

## 2019-10-18 PROCEDURE — 71045 X-RAY EXAM CHEST 1 VIEW: CPT

## 2019-10-18 PROCEDURE — 94644 CONT INHLJ TX 1ST HOUR: CPT

## 2019-10-18 PROCEDURE — 85379 FIBRIN DEGRADATION QUANT: CPT

## 2019-10-18 PROCEDURE — 80053 COMPREHEN METABOLIC PANEL: CPT

## 2019-10-18 PROCEDURE — 81025 URINE PREGNANCY TEST: CPT

## 2019-10-18 PROCEDURE — 96375 TX/PRO/DX INJ NEW DRUG ADDON: CPT

## 2019-10-18 PROCEDURE — 96365 THER/PROPH/DIAG IV INF INIT: CPT

## 2019-10-18 PROCEDURE — 71275 CT ANGIOGRAPHY CHEST: CPT

## 2019-10-18 PROCEDURE — 85007 BL SMEAR W/DIFF WBC COUNT: CPT

## 2019-10-18 PROCEDURE — G0379 DIRECT REFER HOSPITAL OBSERV: HCPCS

## 2019-10-18 PROCEDURE — 99291 CRITICAL CARE FIRST HOUR: CPT

## 2019-10-18 PROCEDURE — 93005 ELECTROCARDIOGRAM TRACING: CPT

## 2019-10-18 NOTE — PHYS DOC
Past Medical History


Past Medical History:  Asthma


Past Surgical History:  Appendectomy


Alcohol Use:  None


Drug Use:  None





Adult General


Chief Complaint


Chief Complaint:  ASTHMA





HPI


HPI





34-year-old female presents to the emergency department with complaints of 

shortness of breath. Patient has underlying history of asthma states approximate

30 minutes prior to arrival she developed shortness of breath. She attended use 

inhalers at home however unsuccessful. She presented to the ER with respiratory 

rate in the 40s, speaking in 2 word sentences. She is coughing on examination, 

cachectic, use of accessory muscles.: Respiratory distress. Saturations 84% on 

initial arrival. Son is at bedside with patient providing history.





Review of Systems


Review of Systems





Review of systems is limited secondary to patient's current condition, patient 

with evidence of 2 word sentences, clearly short of breath and coughing.





All other systems were reviewed and found to be within normal limits, except as 

documented in this note.





Current Medications


Current Medications





Current Medications








 Medications


  (Trade)  Dose


 Ordered  Sig/Wander  Start Time


 Stop Time Status Last Admin


Dose Admin


 


 Albuterol/


 Ipratropium


  (Duoneb)  3 ml  1X  ONCE  10/18/19 19:30


 10/18/19 19:31 DC 10/18/19 19:26


3 ML


 


 Magnesium Sulfate  50 ml @ 25


 mls/hr  1X  ONCE  10/18/19 19:00


 10/18/19 20:59 DC 10/18/19 19:01


25 MLS/HR


 


 Methylprednisolone


 Sodium Succinate


  (SOLU-Medrol


 125MG VIAL)  125 mg  1X  ONCE  10/18/19 19:15


 10/18/19 19:16 DC 10/18/19 19:02


125 MG


 


 Sodium Chloride  1,000 ml @ 


 1,000 mls/hr  1X  ONCE  10/18/19 19:45


 10/18/19 20:44 DC 10/18/19 19:40


1,000 MLS/HR


 


 Terbutaline


 Sulfate


  (Brethine)  0.25 mg  1X  ONCE  10/18/19 19:00


 10/18/19 19:18 DC  














Allergies


Allergies





Allergies








Coded Allergies Type Severity Reaction Last Updated Verified


 


  No Known Drug Allergies    11/4/14 No











Physical Exam


Physical Exam





Constitutional: Well developed, well nourished, no acute distress, non-toxic 

appearance. []


HENT: Normocephalic, atraumatic, bilateral external ears normal, oropharynx 

moist, no oral exudates, nose normal. []


Eyes: PERRLA, EOMI, conjunctiva normal, no discharge. [] 


Neck: Normal range of motion, no tenderness, supple, no stridor. [] 


Cardiovascular:Heart rate regular rhythm, no murmur []


Lungs & Thorax:  Bilateral breath sounds clear to auscultation []


Abdomen: Bowel sounds normal, soft, no tenderness, no masses, no pulsatile 

masses. [] 


Skin: Warm, dry, no erythema, no rash. [] 


Back: No tenderness, no CVA tenderness. [] 


Extremities: No tenderness, no cyanosis, no clubbing, ROM intact, no edema. [] 


Neurologic: Alert and oriented X 3, normal motor function, normal sensory 

function, no focal deficits noted. []


Psychologic: Affect normal, judgement normal, mood normal. []





Current Patient Data


Vital Signs





                                   Vital Signs








  Date Time  Temp Pulse Resp B/P (MAP) Pulse Ox O2 Delivery O2 Flow Rate FiO2


 


10/18/19 19:00     88  3.0 


 


10/18/19 18:50 97.4 133 44 171/105 (127)  Room Air  





 97.4       








Lab Values





                                Laboratory Tests








Test


 10/18/19


18:55


 


White Blood Count


 19.0 x10^3/uL


(4.0-11.0)  H


 


Red Blood Count


 5.63 x10^6/uL


(3.50-5.40)  H


 


Hemoglobin


 15.1 g/dL


(12.0-15.5)


 


Hematocrit


 45.7 %


(36.0-47.0)


 


Mean Corpuscular Volume


 81 fL ()





 


Mean Corpuscular Hemoglobin 27 pg (25-35)  


 


Mean Corpuscular Hemoglobin


Concent 33 g/dL


(31-37)


 


Red Cell Distribution Width


 14.6 %


(11.5-14.5)  H


 


Platelet Count


 458 x10^3/uL


(140-400)  H


 


Neutrophils (%) (Auto) 70 % (31-73)  


 


Lymphocytes (%) (Auto) 22 % (24-48)  L


 


Monocytes (%) (Auto) 5 % (0-9)  


 


Eosinophils (%) (Auto) 3 % (0-3)  


 


Basophils (%) (Auto) 1 % (0-3)  


 


Neutrophils # (Auto)


 13.2 x10^3/uL


(1.8-7.7)  H


 


Lymphocytes # (Auto)


 4.2 x10^3/uL


(1.0-4.8)


 


Monocytes # (Auto)


 0.9 x10^3/uL


(0.0-1.1)


 


Eosinophils # (Auto)


 0.5 x10^3/uL


(0.0-0.7)


 


Basophils # (Auto)


 0.1 x10^3/uL


(0.0-0.2)


 


Segmented Neutrophils % 79 % (35-66)  H


 


Band Neutrophils % 2 % (0-9)  


 


Lymphocytes % 17 % (24-48)  L


 


Monocytes % 1 % (0-10)  


 


Eosinophils % 1 % (0-5)  


 


Platelet Estimate


 Adequate


(ADEQUATE)


 


D-Dimer (Barby)


 0.53 ug/mlFEU


(0.00-0.50)  H


 


Sodium Level


 140 mmol/L


(136-145)


 


Potassium Level


 3.5 mmol/L


(3.5-5.1)


 


Chloride Level


 104 mmol/L


()


 


Carbon Dioxide Level


 24 mmol/L


(21-32)


 


Anion Gap 12 (6-14)  


 


Blood Urea Nitrogen


 7 mg/dL (7-20)





 


Creatinine


 1.0 mg/dL


(0.6-1.0)


 


Estimated GFR


(Cockcroft-Gault) 62.7  





 


BUN/Creatinine Ratio 7 (6-20)  


 


Glucose Level


 109 mg/dL


(70-99)  H


 


Calcium Level


 8.9 mg/dL


(8.5-10.1)


 


Total Bilirubin


 0.3 mg/dL


(0.2-1.0)


 


Aspartate Amino Transferase


(AST) 19 U/L (15-37)





 


Alanine Aminotransferase (ALT)


 23 U/L (14-59)





 


Alkaline Phosphatase


 113 U/L


()


 


Total Protein


 6.9 g/dL


(6.4-8.2)


 


Albumin


 3.2 g/dL


(3.4-5.0)  L


 


Albumin/Globulin Ratio


 0.9 (1.0-1.7)


L





                                Laboratory Tests


10/18/19 18:55








                                Laboratory Tests


10/18/19 18:55











EKG


EKG


EKG reviewed, urgent EKG given patient's rate. Heart rate 128, sinus 

tachycardia.[]


Interpretation Time:


Interpretation time 1900





Radiology/Procedures


Radiology/Procedures


[]





Course & Med Decision Making


Course & Med Decision Making


Pertinent Labs and Imaging studies reviewed. (See chart for details)





[]34-year-old female presents to the emergency department with complaints of 

shortness of breath. Patient has underlying history of asthma states approximate

 30 minutes prior to arrival she developed shortness of breath. She attended use

 inhalers at home however unsuccessful. She presented to the ER with respiratory

 rate in the 40s, speaking in 2 word sentences. She is coughing on examination, 

cachectic, use of accessory muscles.: Respiratory distress. Saturations 84% on 

initial arrival. Son is at bedside with patient providing history.





Upon arrival patient with severe respiratory distress speaking in 2-3 word sent

ences affect, saturations 85% on room air. Patient received 125 mg of Solu-

Medrol, DuoNeb 1 hour, magnesium 2 g IV over 10 minutes. Saturations slowly 

improved currently she is 98% on 2 L nasal cannula. Heart rate had been up to 

140s to 160s now currently 120. Laboratory values revealed white blood cell 

count 19, chest x-ray reveals no evidence of acute consolidation. D-dimer is 

mildly elevated 0.53, likely incidental finding however will be complete with 

CTA of chest.





Given significants of patient's presentation upon arrival would recommend 

observation overnight and continued treatments as well as steroid therapy.


Discussed with patient at bedside she agrees for admission.





Discussed admit with Hospitalist





Renetta Disclaimer


Dragon Disclaimer


This electronic medical record was generated, in whole or in part, using a voice

 recognition dictation system.





Departure


Departure


Impression:  


   Primary Impression:  


   Asthma exacerbation


   Additional Impression:  


   Hypoxia


Disposition:  09 ADMITTED AS INPATIENT


Admitting Physician:  HIMS


Condition:  IMPROVED


Referrals:  


NO PCP (PCP)


Critical Care Time


 Critical care time was 40 minutes exclusive of procedures.





Problem Qualifiers











MARI HANSEN MD              Oct 18, 2019 19:18

## 2019-10-18 NOTE — RAD
CTA Chest with contrast:

 

Clinical History: Shortness of breath.

 

Axial helical images of the chest were obtained after the administration 

of 100 cc of IV Omni 350 and timed appropriately for a pulmonary arterial 

study.  Conventional axial reconstruction was performed in addition to 

coronal, sagittal and bilateral oblique MIP (maximum intensity 

projection).  This study was ordered to detect possible pulmonary 

embolism.

 

There are no filling defects to suggest pulmonary embolism. 

 

 

There is patchy opacity in the left upper lobe anterior medially. There is

vague patchy groundglass opacities in the left lower lobe and in the 

lingula and there is groundglass opacities throughout the remaining lungs.

 

There is a moderately enlarged lymph node in the left hilum. 

 

 

The thoracic aorta appears normal.

 

Impression:

 

1.  No evidence of pulmonary embolism.

2.  Bilateral infiltrates and mild left hilar lymphadenopathy. This could 

be secondary to atypical pneumonia including TB. Recommend follow-up chest

x-ray complete resolution.

 

PQRS Compliance Statement:

 

One or more of the following individualized dose reduction techniques were

utilized for this examination:  

1. Automated exposure control  

2. Adjustment of the mA and/or kV according to patient size  

3. Use of iterative reconstruction technique

 

Electronically signed by: Uriel Araya III, MD (10/18/2019 10:54 PM) 

[id number]

## 2019-10-19 VITALS
DIASTOLIC BLOOD PRESSURE: 61 MMHG | SYSTOLIC BLOOD PRESSURE: 105 MMHG | DIASTOLIC BLOOD PRESSURE: 61 MMHG | SYSTOLIC BLOOD PRESSURE: 105 MMHG

## 2019-10-19 VITALS — DIASTOLIC BLOOD PRESSURE: 68 MMHG | SYSTOLIC BLOOD PRESSURE: 99 MMHG

## 2019-10-19 VITALS — SYSTOLIC BLOOD PRESSURE: 110 MMHG | DIASTOLIC BLOOD PRESSURE: 69 MMHG

## 2019-10-19 LAB
ALBUMIN SERPL-MCNC: 2.9 G/DL (ref 3.4–5)
ALBUMIN/GLOB SERPL: 0.8 {RATIO} (ref 1–1.7)
ALP SERPL-CCNC: 100 U/L (ref 46–116)
ALT SERPL-CCNC: 25 U/L (ref 14–59)
ANION GAP SERPL CALC-SCNC: 13 MMOL/L (ref 6–14)
AST SERPL-CCNC: 16 U/L (ref 15–37)
BASOPHILS # BLD AUTO: 0 X10^3/UL (ref 0–0.2)
BASOPHILS NFR BLD: 0 % (ref 0–3)
BILIRUB SERPL-MCNC: 0.4 MG/DL (ref 0.2–1)
BUN SERPL-MCNC: 12 MG/DL (ref 7–20)
BUN/CREAT SERPL: 11 (ref 6–20)
CALCIUM SERPL-MCNC: 8.5 MG/DL (ref 8.5–10.1)
CHLORIDE SERPL-SCNC: 108 MMOL/L (ref 98–107)
CO2 SERPL-SCNC: 20 MMOL/L (ref 21–32)
CREAT SERPL-MCNC: 1.1 MG/DL (ref 0.6–1)
EOSINOPHIL NFR BLD: 0 % (ref 0–3)
EOSINOPHIL NFR BLD: 0 X10^3/UL (ref 0–0.7)
ERYTHROCYTE [DISTWIDTH] IN BLOOD BY AUTOMATED COUNT: 14.8 % (ref 11.5–14.5)
GFR SERPLBLD BASED ON 1.73 SQ M-ARVRAT: 56.2 ML/MIN
GLOBULIN SER-MCNC: 3.6 G/DL (ref 2.2–3.8)
GLUCOSE SERPL-MCNC: 156 MG/DL (ref 70–99)
HCT VFR BLD CALC: 41.8 % (ref 36–47)
HGB BLD-MCNC: 14.1 G/DL (ref 12–15.5)
LYMPHOCYTES # BLD: 0.6 X10^3/UL (ref 1–4.8)
LYMPHOCYTES NFR BLD AUTO: 4 % (ref 24–48)
MCH RBC QN AUTO: 27 PG (ref 25–35)
MCHC RBC AUTO-ENTMCNC: 34 G/DL (ref 31–37)
MCV RBC AUTO: 80 FL (ref 79–100)
MONO #: 0.1 X10^3/UL (ref 0–1.1)
MONOCYTES NFR BLD: 1 % (ref 0–9)
NEUT #: 16.2 X10^3/UL (ref 1.8–7.7)
NEUTROPHILS NFR BLD AUTO: 95 % (ref 31–73)
PLATELET # BLD AUTO: 377 X10^3/UL (ref 140–400)
POTASSIUM SERPL-SCNC: 4.1 MMOL/L (ref 3.5–5.1)
PROT SERPL-MCNC: 6.5 G/DL (ref 6.4–8.2)
RBC # BLD AUTO: 5.2 X10^6/UL (ref 3.5–5.4)
SODIUM SERPL-SCNC: 141 MMOL/L (ref 136–145)
WBC # BLD AUTO: 17 X10^3/UL (ref 4–11)

## 2019-10-19 RX ADMIN — IPRATROPIUM BROMIDE AND ALBUTEROL SULFATE SCH ML: .5; 3 SOLUTION RESPIRATORY (INHALATION) at 08:16

## 2019-10-19 RX ADMIN — IPRATROPIUM BROMIDE AND ALBUTEROL SULFATE SCH ML: .5; 3 SOLUTION RESPIRATORY (INHALATION) at 12:00

## 2019-10-19 NOTE — PDOC3
Discharge Summary


Date of Admission:  Oct 18, 2019


Date of Discharge:  Oct 19, 2019


Follow-Up:  3-5 days


Admitting Diagnosis comment:





DISCHARGE DX ================





Assessment/Plan


Impression:  





   ACUTE Asthma exacerbation, IMPROVED


   OBESITY


   Hypoxia, RESOLVED








ADMITTED 





D/C TODAY


PREDNISONE TAPER


Z-PACK


INHALER, ALBUTEROL Q 4 HRS 2 PUFFS PRN


AVOID SMOKE, DUST








54 MIN ADMIT/ D/C PLANNING TIME


FINAL DIAGNOSIS


Problems


Medical Problems:


(1) Asthma exacerbation


Status: Acute  





(2) Hypoxia


Status: Acute  








Brief Hospital Course


Ms. Mercado  is a 34 old [sex] who presented with [ ACUTE ASTHMA EXAC]


CONDITION AT DISCHARGE:  Improved


Discharge Medications





Current Medications


Albuterol/ Ipratropium (Duoneb) 3 ml 1X  ONCE NEB  Last administered on 

10/18/19at 19:09;  Start 10/18/19 at 19:15;  Stop 10/18/19 at 19:16;  Status DC


Methylprednisolone Sodium Succinate (SOLU-Medrol 125MG VIAL) 125 mg 1X  ONCE IV 

Last administered on 10/18/19at 19:02;  Start 10/18/19 at 19:15;  Stop 10/18/19 

at 19:16;  Status DC


Magnesium Sulfate 50 ml @ 25 mls/hr 1X  ONCE IV  Last administered on 10/18/19at

19:01;  Start 10/18/19 at 19:00;  Stop 10/18/19 at 20:59;  Status DC


Terbutaline Sulfate (Brethine) 0.25 mg 1X  ONCE SQ ;  Start 10/18/19 at 19:00;  

Stop 10/18/19 at 19:18;  Status DC


Albuterol/ Ipratropium (Duoneb) 3 ml 1X  ONCE NEB  Last administered on 

10/18/19at 19:25;  Start 10/18/19 at 19:30;  Stop 10/18/19 at 19:31;  Status DC


Albuterol/ Ipratropium (Duoneb) 3 ml 1X  ONCE NEB  Last administered on 

10/18/19at 19:26;  Start 10/18/19 at 19:30;  Stop 10/18/19 at 19:31;  Status DC


Sodium Chloride 1,000 ml @  1,000 mls/hr 1X  ONCE IV  Last administered on 

10/18/19at 19:40;  Start 10/18/19 at 19:45;  Stop 10/18/19 at 20:44;  Status DC


Ondansetron HCl (Zofran) 4 mg PRN Q8HRS  PRN IV NAUSEA/VOMITING;  Start 10/18/19

at 21:15;  Stop 10/19/19 at 21:14


Acetaminophen (Tylenol) 650 mg PRN Q4HRS  PRN PO FEVER;  Start 10/18/19 at 21:1

5;  Stop 10/19/19 at 21:14


Albuterol/ Ipratropium (Duoneb) 3 ml RTQID NEB  Last administered on 10/19/19at 

08:16;  Start 10/19/19 at 08:00;  Stop 10/20/19 at 07:59


Iohexol (Omnipaque 350 Mg/ml) 100 ml 1X  ONCE IV  Last administered on 

10/18/19at 22:00;  Start 10/18/19 at 22:00;  Stop 10/18/19 at 22:01;  Status DC


Info (CONTRAST GIVEN -- Rx MONITORING) 1 each PRN DAILY  PRN MC SEE COMMENTS;  

Start 10/18/19 at 22:00;  Stop 10/20/19 at 21:59


Diphenhydramine HCl (Benadryl) 25 mg 1X  ONCE IVP  Last administered on 

10/18/19at 22:45;  Start 10/18/19 at 22:45;  Stop 10/18/19 at 22:46;  Status DC


Famotidine (Pepcid Vial) 20 mg 1X  ONCE IVP  Last administered on 10/18/19at 

22:45;  Start 10/18/19 at 22:45;  Stop 10/18/19 at 22:46;  Status DC


Azithromycin (Zithromax) 250 mg DAILY08 PO ;  Start 10/19/19 at 13:00


Benzonatate (Tessalon Perle) 100 mg TID PO ;  Start 10/19/19 at 14:00


Cetirizine HCl (ZyrTEC) 10 mg DAILY PO ;  Start 10/20/19 at 09:00


Prednisone (Prednisone) 20 mg BID PO ;  Start 10/19/19 at 21:00;  Status UNV


Promethazine HCl (Phenergan Syrup) 6.25 mg PRN Q6HRS  PRN PO NAUSEA/VOMITING;  

Start 10/19/19 at 13:00;  Stop 10/19/19 at 12:53;  Status DC


Pseudoephedrine HCl (Sudafed 12-Hour) 120 mg DAILY PO ;  Start 10/20/19 at 09:00


Albuterol Sulfate (Ventolin Neb Soln) 2.5 mg RTQID NEB ;  Start 10/19/19 at 

16:00


Prenatal Multivit/ Folic Acid/Iron (Multivitamin Prenatal) 1 tab DAILY PO ;  

Start 10/20/19 at 09:00


Methylprednisolone Sodium Succinate (SOLU-Medrol 125MG VIAL) 80 mg Q8HRS IV ;  

Start 10/19/19 at 14:00


Sodium Chloride (Normal Saline Flush) 3 ml QSHIFT  PRN IV AFTER MEDS AND BLOOD 

DRAWS;  Start 10/19/19 at 12:30


Sodium Chloride 1,000 ml @  100 mls/hr Q10H IV ;  Start 10/19/19 at 12:29


Ondansetron HCl (Zofran) 4 mg PRN Q4HRS  PRN IV NAUSEA/VOMITING;  Start 10/19/19

at 12:30


Acetaminophen (Tylenol) 650 mg PRN Q4HRS  PRN PO TEMP OVER 100.4F OR MILD PAIN; 

Start 10/19/19 at 12:30


Clonidine HCl (Catapres) 0.1 mg PRN Q6HRS  PRN PO SBP>160 OR DBP>90;  Start 

10/19/19 at 12:30


Docusate Sodium (Colace) 100 mg PRN BID  PRN PO CONSTIPATION;  Start 10/19/19 at

12:30


Albuterol Sulfate (Ventolin Neb Soln) 2.5 mg PRN Q4HRS  PRN NEB SHORTNESS OF 

BREATH;  Start 10/19/19 at 12:30


Lorazepam (Ativan) 0.5 mg PRN Q4HRS  PRN PO ANXIETY / AGITATION;  Start 10/19/19

at 12:30


Enoxaparin Sodium (Lovenox 40mg Syringe) 40 mg DAILY SQ ;  Start 10/19/19 at 

13:00


Budesonide (Pulmicort) 0.5 mg RTBID NEB ;  Start 10/19/19 at 20:00





Active Scripts


Active


Prednisone 20 Mg Tablet 1 Tab PO BID 5 Days


Zyrtec (Cetirizine Hcl) 10 Mg Tablet 1 Tab PO DAILY


Azithromycin Tablet (Azithromycin) 250 Mg Tablet 1 Pkg PO UD


Tessalon Perle (Benzonatate) 100 Mg Capsule 1 Cap PO TID


Medrol (Methylprednisolone) 4 Mg Tab.ds.pk 1 Pkg PO UD


Proventil Hfa (Albuterol Sulfate) 6.7 Gm Hfa.aer.ad 1 Puff INH PRN Q6HRS PRN


Prednisone 50 Mg Tablet 1 Tab PO DAILY


Proventil Hfa Inhaler (Albuterol Sulfate) 6.7 Gm Hfa.aer.ad 1 Puff IH PRN Q4HRS 

PRN


Tessalon Perle (Benzonatate) 100 Mg Capsule 1 Cap PO TID


Promethazine-Dm Syrup (D-Methorphan Hb/Prometh Hcl) 118 Ml Syrup 5 Ml PO PRN 

Q4HRS


Atrovent Hfa (Ipratropium Bromide) 12.9 Gm Hfa.aer.ad 12.9 Gm IH Q6HRS


Prednisone 20 Mg Tablet 2 Tab PO DAILY 5 Days


Prednisone 50 Mg Tablet 1 Tab PO DAILY


Tylenol With Codeine #3 Tablet (Acetaminophen/Codeine Phosphate) 1 Each Tablet 1

Each PO Q4H PRN


Ibuprofen 800 Mg Tablet 800 Mg PO Q6H PRN


Reported


Advair 250-50 Diskus (Fluticasone/Salmeterol) 1 Each Disk.w.dev 1 Puff IH BID


Prenatal Tablet (Pnv Cmb#95/Ferrous Fumarate/Fa) 1 Each Tablet 1 Tab PO DAILY


Proair Hfa Inhaler (Albuterol Sulfate) 8.5 Gm Hfa.aer.ad 2 Puff IH PRN Q4-6HRS 

PRN


Allegra-D 24 Hour Tablet (Fexofenadine/Pseudoephedrine) 1 Each Tab.er.24h 1 Tab 

PO DAILY


Vital Signs





Vital Signs








  Date Time  Temp Pulse Resp B/P (MAP) Pulse Ox O2 Delivery O2 Flow Rate FiO2


 


10/19/19 11:10 97.9 87 16 105/61 (76) 98 Room Air  





 97.9       


 


10/19/19 02:12       2.0 








Labs





Laboratory Tests








Test


 10/18/19


18:55 10/18/19


21:30 10/19/19


04:10


 


White Blood Count


 19.0 x10^3/uL


(4.0-11.0) 


 17.0 x10^3/uL


(4.0-11.0)


 


Red Blood Count


 5.63 x10^6/uL


(3.50-5.40) 


 5.20 x10^6/uL


(3.50-5.40)


 


Hemoglobin


 15.1 g/dL


(12.0-15.5) 


 14.1 g/dL


(12.0-15.5)


 


Hematocrit


 45.7 %


(36.0-47.0) 


 41.8 %


(36.0-47.0)


 


Mean Corpuscular Volume 81 fL ()   80 fL () 


 


Mean Corpuscular Hemoglobin 27 pg (25-35)   27 pg (25-35) 


 


Mean Corpuscular Hemoglobin


Concent 33 g/dL


(31-37) 


 34 g/dL


(31-37)


 


Red Cell Distribution Width


 14.6 %


(11.5-14.5) 


 14.8 %


(11.5-14.5)


 


Platelet Count


 458 x10^3/uL


(140-400) 


 377 x10^3/uL


(140-400)


 


Neutrophils (%) (Auto) 70 % (31-73)   95 % (31-73) 


 


Lymphocytes (%) (Auto) 22 % (24-48)   4 % (24-48) 


 


Monocytes (%) (Auto) 5 % (0-9)   1 % (0-9) 


 


Eosinophils (%) (Auto) 3 % (0-3)   0 % (0-3) 


 


Basophils (%) (Auto) 1 % (0-3)   0 % (0-3) 


 


Neutrophils # (Auto)


 13.2 x10^3/uL


(1.8-7.7) 


 16.2 x10^3/uL


(1.8-7.7)


 


Lymphocytes # (Auto)


 4.2 x10^3/uL


(1.0-4.8) 


 0.6 x10^3/uL


(1.0-4.8)


 


Monocytes # (Auto)


 0.9 x10^3/uL


(0.0-1.1) 


 0.1 x10^3/uL


(0.0-1.1)


 


Eosinophils # (Auto)


 0.5 x10^3/uL


(0.0-0.7) 


 0.0 x10^3/uL


(0.0-0.7)


 


Basophils # (Auto)


 0.1 x10^3/uL


(0.0-0.2) 


 0.0 x10^3/uL


(0.0-0.2)


 


Segmented Neutrophils % 79 % (35-66)   


 


Band Neutrophils % 2 % (0-9)   


 


Lymphocytes % 17 % (24-48)   


 


Monocytes % 1 % (0-10)   


 


Eosinophils % 1 % (0-5)   


 


Platelet Estimate


 Adequate


(ADEQUATE) 


 





 


D-Dimer (Barby)


 0.53 ug/mlFEU


(0.00-0.50) 


 





 


Sodium Level


 140 mmol/L


(136-145) 


 141 mmol/L


(136-145)


 


Potassium Level


 3.5 mmol/L


(3.5-5.1) 


 4.1 mmol/L


(3.5-5.1)


 


Chloride Level


 104 mmol/L


() 


 108 mmol/L


()


 


Carbon Dioxide Level


 24 mmol/L


(21-32) 


 20 mmol/L


(21-32)


 


Anion Gap 12 (6-14)   13 (6-14) 


 


Blood Urea Nitrogen


 7 mg/dL (7-20) 


 


 12 mg/dL


(7-20)


 


Creatinine


 1.0 mg/dL


(0.6-1.0) 


 1.1 mg/dL


(0.6-1.0)


 


Estimated GFR


(Cockcroft-Gault) 62.7 


 


 56.2 





 


BUN/Creatinine Ratio 7 (6-20)   11 (6-20) 


 


Glucose Level


 109 mg/dL


(70-99) 


 156 mg/dL


(70-99)


 


Calcium Level


 8.9 mg/dL


(8.5-10.1) 


 8.5 mg/dL


(8.5-10.1)


 


Total Bilirubin


 0.3 mg/dL


(0.2-1.0) 


 0.4 mg/dL


(0.2-1.0)


 


Aspartate Amino Transf


(AST/SGOT) 19 U/L (15-37) 


 


 16 U/L (15-37) 





 


Alanine Aminotransferase


(ALT/SGPT) 23 U/L (14-59) 


 


 25 U/L (14-59) 





 


Alkaline Phosphatase


 113 U/L


() 


 100 U/L


()


 


Total Protein


 6.9 g/dL


(6.4-8.2) 


 6.5 g/dL


(6.4-8.2)


 


Albumin


 3.2 g/dL


(3.4-5.0) 


 2.9 g/dL


(3.4-5.0)


 


Albumin/Globulin Ratio 0.9 (1.0-1.7)   0.8 (1.0-1.7) 


 


Bedside Urine HCG, Qualitative


 


 Hcg negative


(Negative) 











Laboratory Tests








Test


 10/18/19


18:55 10/18/19


21:30 10/19/19


04:10


 


White Blood Count


 19.0 x10^3/uL


(4.0-11.0) 


 17.0 x10^3/uL


(4.0-11.0)


 


Red Blood Count


 5.63 x10^6/uL


(3.50-5.40) 


 5.20 x10^6/uL


(3.50-5.40)


 


Hemoglobin


 15.1 g/dL


(12.0-15.5) 


 14.1 g/dL


(12.0-15.5)


 


Hematocrit


 45.7 %


(36.0-47.0) 


 41.8 %


(36.0-47.0)


 


Mean Corpuscular Volume 81 fL ()   80 fL () 


 


Mean Corpuscular Hemoglobin 27 pg (25-35)   27 pg (25-35) 


 


Mean Corpuscular Hemoglobin


Concent 33 g/dL


(31-37) 


 34 g/dL


(31-37)


 


Red Cell Distribution Width


 14.6 %


(11.5-14.5) 


 14.8 %


(11.5-14.5)


 


Platelet Count


 458 x10^3/uL


(140-400) 


 377 x10^3/uL


(140-400)


 


Neutrophils (%) (Auto) 70 % (31-73)   95 % (31-73) 


 


Lymphocytes (%) (Auto) 22 % (24-48)   4 % (24-48) 


 


Monocytes (%) (Auto) 5 % (0-9)   1 % (0-9) 


 


Eosinophils (%) (Auto) 3 % (0-3)   0 % (0-3) 


 


Basophils (%) (Auto) 1 % (0-3)   0 % (0-3) 


 


Neutrophils # (Auto)


 13.2 x10^3/uL


(1.8-7.7) 


 16.2 x10^3/uL


(1.8-7.7)


 


Lymphocytes # (Auto)


 4.2 x10^3/uL


(1.0-4.8) 


 0.6 x10^3/uL


(1.0-4.8)


 


Monocytes # (Auto)


 0.9 x10^3/uL


(0.0-1.1) 


 0.1 x10^3/uL


(0.0-1.1)


 


Eosinophils # (Auto)


 0.5 x10^3/uL


(0.0-0.7) 


 0.0 x10^3/uL


(0.0-0.7)


 


Basophils # (Auto)


 0.1 x10^3/uL


(0.0-0.2) 


 0.0 x10^3/uL


(0.0-0.2)


 


Segmented Neutrophils % 79 % (35-66)   


 


Band Neutrophils % 2 % (0-9)   


 


Lymphocytes % 17 % (24-48)   


 


Monocytes % 1 % (0-10)   


 


Eosinophils % 1 % (0-5)   


 


Platelet Estimate


 Adequate


(ADEQUATE) 


 





 


D-Dimer (Barby)


 0.53 ug/mlFEU


(0.00-0.50) 


 





 


Sodium Level


 140 mmol/L


(136-145) 


 141 mmol/L


(136-145)


 


Potassium Level


 3.5 mmol/L


(3.5-5.1) 


 4.1 mmol/L


(3.5-5.1)


 


Chloride Level


 104 mmol/L


() 


 108 mmol/L


()


 


Carbon Dioxide Level


 24 mmol/L


(21-32) 


 20 mmol/L


(21-32)


 


Anion Gap 12 (6-14)   13 (6-14) 


 


Blood Urea Nitrogen


 7 mg/dL (7-20) 


 


 12 mg/dL


(7-20)


 


Creatinine


 1.0 mg/dL


(0.6-1.0) 


 1.1 mg/dL


(0.6-1.0)


 


Estimated GFR


(Cockcroft-Gault) 62.7 


 


 56.2 





 


BUN/Creatinine Ratio 7 (6-20)   11 (6-20) 


 


Glucose Level


 109 mg/dL


(70-99) 


 156 mg/dL


(70-99)


 


Calcium Level


 8.9 mg/dL


(8.5-10.1) 


 8.5 mg/dL


(8.5-10.1)


 


Total Bilirubin


 0.3 mg/dL


(0.2-1.0) 


 0.4 mg/dL


(0.2-1.0)


 


Aspartate Amino Transf


(AST/SGOT) 19 U/L (15-37) 


 


 16 U/L (15-37) 





 


Alanine Aminotransferase


(ALT/SGPT) 23 U/L (14-59) 


 


 25 U/L (14-59) 





 


Alkaline Phosphatase


 113 U/L


() 


 100 U/L


()


 


Total Protein


 6.9 g/dL


(6.4-8.2) 


 6.5 g/dL


(6.4-8.2)


 


Albumin


 3.2 g/dL


(3.4-5.0) 


 2.9 g/dL


(3.4-5.0)


 


Albumin/Globulin Ratio 0.9 (1.0-1.7)   0.8 (1.0-1.7) 


 


Bedside Urine HCG, Qualitative


 


 Hcg negative


(Negative) 











Allergies





                                    Allergies








Coded Allergies Type Severity Reaction Last Updated Verified


 


  No Known Drug Allergies    11/4/14 No








Disposition/Orders:  D/C to Home


Patient Instructions


D/C PLANNING 54 MIN











AMPARO JEFFERSON MD          Oct 19, 2019 13:12

## 2019-10-19 NOTE — DISCH
DISCHARGE INSTRUCTIONS


Condition on Discharge


Condition on Discharge:  Stable





Activity After Discharge


Activity Instructions for Disc:  No restrictions


Lifting Instructions after Dis:  No heavy lifting, No pulling or pushing, Do not

lift >10 pounds


Exercise Instruction after Dis:  Progress as tolerated


Driving Instructions after Dis:  Do not drive today, No driving for 2 weeks


Weight Bearing Status after Di:  As tolerated





Diet after Discharge


Diet after Discharge:  Regular


Diet Texture:  Regular





Wound Incision Care


Wound/Incision Care:  No wound care needed





Checks after Discharge


Checks after discharge:  Check blood press - daily, Check your Temp as needed





Contacting the DR. after DC


Call your doctor for:  If your condition worsens





Treatment/Equipment after DC


Adaptive Equipment Issued:  None











AMPARO JEFFERSON MD          Oct 19, 2019 13:15

## 2019-10-19 NOTE — NUR
Pt states she feels great and is ready to DC. Did not need her noon breathing treatment. On 
RA sats at 96%. Denies SOA, pain. Cont. to monitor.

## 2019-10-19 NOTE — RAD
Chest AP portable at 1854:

 

Reason for examination: Short of breath. Asthma.

 

The heart size is normal. Mediastinum is unremarkable. Lung fields are 

clear. No acute bony abnormalities are seen.

 

Impression:

 

No acute cardiopulmonary disease.

 

Electronically signed by: Adelaida Nuñez MD (10/19/2019 1:49 AM) [id number]

## 2019-10-19 NOTE — PDOC1
History and Physical


Date of Admission


Date of Admission


DATE: 10/19/19 


TIME: 12:21





Identification/Chief Complaint


Chief Complaint


 SEEN IN ER , 34-year-old female presents to the emergency department with 

complaints of shortness of breath. Patient has underlying history of asthma 

states approximate 30 minutes prior to arrival she developed shortness of 

breath. 





She attended use inhalers at home however unsuccessful. She presented to the ER 

with respiratory rate in the 40s, speaking in 2 word sentences. She is coughing 

on examination, cachectic, use of accessory muscles.: Respiratory distress. IN 

ER





Saturations 84% on initial arrival. Son is at bedside with patient providing hi

story. FEELS MUCH BETTER TODAY, INSISTS ON GOIG HOME TODAY





Past Medical History


Past Medical History:  Asthma


Past Surgical History:  Appendectomy


Alcohol Use:  None


Drug Use:  None





FHX HTN


Cardiovascular:  No pertinent hx


GI:  No pertinent hx


ENT:  No pertinent hx


Renal/:  No pertinent hx





Family History


Family History:  Hypertension





Social History


Smoke:  No


ALCOHOL:  none


Drugs:  None





Current Problem List


Problem List


Problems


Medical Problems:


(1) Asthma exacerbation


Status: Acute  





(2) Hypoxia


Status: Acute  











Current Medications


Current Medications





Current Medications


Albuterol/ Ipratropium (Duoneb) 3 ml 1X  ONCE NEB  Last administered on 

10/18/19at 19:09;  Start 10/18/19 at 19:15;  Stop 10/18/19 at 19:16;  Status DC


Methylprednisolone Sodium Succinate (SOLU-Medrol 125MG VIAL) 125 mg 1X  ONCE IV 

Last administered on 10/18/19at 19:02;  Start 10/18/19 at 19:15;  Stop 10/18/19 

at 19:16;  Status DC


Magnesium Sulfate 50 ml @ 25 mls/hr 1X  ONCE IV  Last administered on 10/18/19at

19:01;  Start 10/18/19 at 19:00;  Stop 10/18/19 at 20:59;  Status DC


Terbutaline Sulfate (Brethine) 0.25 mg 1X  ONCE SQ ;  Start 10/18/19 at 19:00;  

Stop 10/18/19 at 19:18;  Status DC


Albuterol/ Ipratropium (Duoneb) 3 ml 1X  ONCE NEB  Last administered on 

10/18/19at 19:25;  Start 10/18/19 at 19:30;  Stop 10/18/19 at 19:31;  Status DC


Albuterol/ Ipratropium (Duoneb) 3 ml 1X  ONCE NEB  Last administered on 

10/18/19at 19:26;  Start 10/18/19 at 19:30;  Stop 10/18/19 at 19:31;  Status DC


Sodium Chloride 1,000 ml @  1,000 mls/hr 1X  ONCE IV  Last administered on 

10/18/19at 19:40;  Start 10/18/19 at 19:45;  Stop 10/18/19 at 20:44;  Status DC


Ondansetron HCl (Zofran) 4 mg PRN Q8HRS  PRN IV NAUSEA/VOMITING;  Start 10/18/19

at 21:15;  Stop 10/19/19 at 21:14


Acetaminophen (Tylenol) 650 mg PRN Q4HRS  PRN PO FEVER;  Start 10/18/19 at 

21:15;  Stop 10/19/19 at 21:14


Albuterol/ Ipratropium (Duoneb) 3 ml RTQID NEB  Last administered on 10/19/19at 

08:16;  Start 10/19/19 at 08:00;  Stop 10/20/19 at 07:59


Iohexol (Omnipaque 350 Mg/ml) 100 ml 1X  ONCE IV  Last administered on 

10/18/19at 22:00;  Start 10/18/19 at 22:00;  Stop 10/18/19 at 22:01;  Status DC


Info (CONTRAST GIVEN -- Rx MONITORING) 1 each PRN DAILY  PRN MC SEE COMMENTS;  

Start 10/18/19 at 22:00;  Stop 10/20/19 at 21:59


Diphenhydramine HCl (Benadryl) 25 mg 1X  ONCE IVP  Last administered on 

10/18/19at 22:45;  Start 10/18/19 at 22:45;  Stop 10/18/19 at 22:46;  Status DC


Famotidine (Pepcid Vial) 20 mg 1X  ONCE IVP  Last administered on 10/18/19at 

22:45;  Start 10/18/19 at 22:45;  Stop 10/18/19 at 22:46;  Status DC





Active Scripts


Active


Prednisone 20 Mg Tablet 1 Tab PO BID 5 Days


Zyrtec (Cetirizine Hcl) 10 Mg Tablet 1 Tab PO DAILY


Azithromycin Tablet (Azithromycin) 250 Mg Tablet 1 Pkg PO UD


Tessalon Perle (Benzonatate) 100 Mg Capsule 1 Cap PO TID


Medrol (Methylprednisolone) 4 Mg Tab.ds.pk 1 Pkg PO UD


Proventil Hfa (Albuterol Sulfate) 6.7 Gm Hfa.aer.ad 1 Puff INH PRN Q6HRS PRN


Prednisone 50 Mg Tablet 1 Tab PO DAILY


Proventil Hfa Inhaler (Albuterol Sulfate) 6.7 Gm Hfa.aer.ad 1 Puff IH PRN Q4HRS 

PRN


Tessalon Perle (Benzonatate) 100 Mg Capsule 1 Cap PO TID


Promethazine-Dm Syrup (D-Methorphan Hb/Prometh Hcl) 118 Ml Syrup 5 Ml PO PRN 

Q4HRS


Atrovent Hfa (Ipratropium Bromide) 12.9 Gm Hfa.aer.ad 12.9 Gm IH Q6HRS


Prednisone 20 Mg Tablet 2 Tab PO DAILY 5 Days


Prednisone 50 Mg Tablet 1 Tab PO DAILY


Tylenol With Codeine #3 Tablet (Acetaminophen/Codeine Phosphate) 1 Each Tablet 1

Each PO Q4H PRN


Ibuprofen 800 Mg Tablet 800 Mg PO Q6H PRN


Reported


Advair 250-50 Diskus (Fluticasone/Salmeterol) 1 Each Disk.w.dev 1 Puff IH BID


Prenatal Tablet (Pnv Cmb#95/Ferrous Fumarate/Fa) 1 Each Tablet 1 Tab PO DAILY


Proair Hfa Inhaler (Albuterol Sulfate) 8.5 Gm Hfa.aer.ad 2 Puff IH PRN Q4-6HRS 

PRN


Allegra-D 24 Hour Tablet (Fexofenadine/Pseudoephedrine) 1 Each Tab.er.24h 1 Tab 

PO DAILY





Allergies


Allergies:  


Coded Allergies:  


     No Known Drug Allergies (Unverified , 11/4/14)





ROS


General:  YES: Fatigue


PSYCHOLOGICAL ROS:  No: Anxiety, Behavioral Disorder, Concentration difficultie,

Decreased libido, Depression, Disorientation, Hallucinations, Hostility, 

Irritablity, Memory difficulties, Mood Swings, Obsessive thoughts, Physical 

abuse, Sexual abuse, Sleep disturbances, Suicidal ideation, Other


HEENT:  No: Heacaches, Visual Changes, Hearing change, Nasal congestion, Nasal 

discharge, Oral lesions, Sinus pain, Sore Throat, Epistaxis, Sneezing, Snoring, 

Tinnitus, Vertigo, Vocal changes, Other


ALLERGY AND IMMUNOLOGY:  No: Hives, Insect Bite Sensitivity, Itchy/Watery Eyes, 

Nasal Congestion, Post Nasal Drip, Seasonal Allergies, Other


Hematological and Lymphatic:  No: Bleeding Problems, Blood Clots, Blood 

Transfusions, Brusing, Night Sweats, Pallor, Swollen Lymph Nodes, Other


Breast:  No New/Changing Breast Lumps, No Nipple changes, No Nipple discharge, 

No Other


Respiratory:  YES: Other (BETTER)


Gastrointestinal:  No Nausea, No Vomiting, No Abdominal Pain, No Diarrhea, No 

Constipation, No Melena, No Hematochezia, No Other


Genitourinary:  No Dysuria, No Frequency, No Incontinence, No Hematuria, No 

Retention, No Discharge, No Urgency, No Pain, No Flank Pain, No Other, No , No ,

No , No , No , No , No 


Neurological:  No Behavorial Changes, No Bowel/Bladder ControlChng, No 

Confusion, No Dizziness, No Gait Disturbance, No Headaches, No Impaired 

Coord/balance, No Memory Loss, No Numbness/Tingling, No Seizures, No Speech 

Problems, No Tremors, No Visual Changes, No Weakness, No Other


Skin:  No Dry Skin, No Eczema, No Hair Changes, No Lumps, No Mole Changes, No 

Mottling, No Nail Changes, No Pruritus, No Rash, No Skin Lesion Changes, No 

Other, No Acne





Physical Exam


Physical Exam





Physical Exam


Physical Exam





Constitutional: Well developed, well nourished, no acute distress, non-toxic 

appearance. []


HENT: Normocephalic, atraumatic, bilateral external ears normal, oropharynx 

moist, no oral exudates, nose normal. []


Eyes: PERRLA, EOMI, conjunctiva normal, no discharge. [] 


Neck: Normal range of motion, no tenderness, supple, no stridor. [] 


Cardiovascular:Heart rate regular rhythm, no murmur []


Lungs & Thorax:  Bilateral breath sounds clear to auscultation []


Abdomen: Bowel sounds normal, soft, no tenderness, no masses, no pulsatile 

masses. [] 


Skin: Warm, dry, no erythema, no rash. [] 


Back: No tenderness, no CVA tenderness. [] 


Extremities: No tenderness, no cyanosis, no clubbing, ROM intact, no edema. [] 


Neurologic: Alert and oriented X 3, normal motor function, normal sensory 

function, no focal deficits noted. []


Psychologic: Affect normal, judgement normal, mood normal. []


General:  Alert, Oriented X3, Cooperative, No acute distress


HEENT:  Atraumatic, PERRLA


Lungs:  Clear to auscultation, Normal air movement


Heart:  S1S2, RRR, no thrills


Breasts:  Not examined


Abdomen:  Normal bowel sounds, Soft


Rectal Exam:  not examined


PELVIC:  Examination not indicated


Extremities:  No cyanosis


Neuro:  Normal speech, Strength at 5/5 X4 ext, Cranial nerves 3-12 NL


Psych/Mental Status:  Mental status NL, Mood NL





Vitals


Vitals





Vital Signs








  Date Time  Temp Pulse Resp B/P (MAP) Pulse Ox O2 Delivery O2 Flow Rate FiO2


 


10/19/19 11:10 97.9 87 16 105/61 (76) 98 Room Air  





 97.9       


 


10/19/19 02:12       2.0 











Labs


Labs





Laboratory Tests








Test


 10/18/19


18:55 10/18/19


21:30 10/19/19


04:10


 


White Blood Count


 19.0 x10^3/uL


(4.0-11.0) 


 17.0 x10^3/uL


(4.0-11.0)


 


Red Blood Count


 5.63 x10^6/uL


(3.50-5.40) 


 5.20 x10^6/uL


(3.50-5.40)


 


Hemoglobin


 15.1 g/dL


(12.0-15.5) 


 14.1 g/dL


(12.0-15.5)


 


Hematocrit


 45.7 %


(36.0-47.0) 


 41.8 %


(36.0-47.0)


 


Mean Corpuscular Volume 81 fL ()   80 fL () 


 


Mean Corpuscular Hemoglobin 27 pg (25-35)   27 pg (25-35) 


 


Mean Corpuscular Hemoglobin


Concent 33 g/dL


(31-37) 


 34 g/dL


(31-37)


 


Red Cell Distribution Width


 14.6 %


(11.5-14.5) 


 14.8 %


(11.5-14.5)


 


Platelet Count


 458 x10^3/uL


(140-400) 


 377 x10^3/uL


(140-400)


 


Neutrophils (%) (Auto) 70 % (31-73)   95 % (31-73) 


 


Lymphocytes (%) (Auto) 22 % (24-48)   4 % (24-48) 


 


Monocytes (%) (Auto) 5 % (0-9)   1 % (0-9) 


 


Eosinophils (%) (Auto) 3 % (0-3)   0 % (0-3) 


 


Basophils (%) (Auto) 1 % (0-3)   0 % (0-3) 


 


Neutrophils # (Auto)


 13.2 x10^3/uL


(1.8-7.7) 


 16.2 x10^3/uL


(1.8-7.7)


 


Lymphocytes # (Auto)


 4.2 x10^3/uL


(1.0-4.8) 


 0.6 x10^3/uL


(1.0-4.8)


 


Monocytes # (Auto)


 0.9 x10^3/uL


(0.0-1.1) 


 0.1 x10^3/uL


(0.0-1.1)


 


Eosinophils # (Auto)


 0.5 x10^3/uL


(0.0-0.7) 


 0.0 x10^3/uL


(0.0-0.7)


 


Basophils # (Auto)


 0.1 x10^3/uL


(0.0-0.2) 


 0.0 x10^3/uL


(0.0-0.2)


 


Segmented Neutrophils % 79 % (35-66)   


 


Band Neutrophils % 2 % (0-9)   


 


Lymphocytes % 17 % (24-48)   


 


Monocytes % 1 % (0-10)   


 


Eosinophils % 1 % (0-5)   


 


Platelet Estimate


 Adequate


(ADEQUATE) 


 





 


D-Dimer (Barby)


 0.53 ug/mlFEU


(0.00-0.50) 


 





 


Sodium Level


 140 mmol/L


(136-145) 


 141 mmol/L


(136-145)


 


Potassium Level


 3.5 mmol/L


(3.5-5.1) 


 4.1 mmol/L


(3.5-5.1)


 


Chloride Level


 104 mmol/L


() 


 108 mmol/L


()


 


Carbon Dioxide Level


 24 mmol/L


(21-32) 


 20 mmol/L


(21-32)


 


Anion Gap 12 (6-14)   13 (6-14) 


 


Blood Urea Nitrogen


 7 mg/dL (7-20) 


 


 12 mg/dL


(7-20)


 


Creatinine


 1.0 mg/dL


(0.6-1.0) 


 1.1 mg/dL


(0.6-1.0)


 


Estimated GFR


(Cockcroft-Gault) 62.7 


 


 56.2 





 


BUN/Creatinine Ratio 7 (6-20)   11 (6-20) 


 


Glucose Level


 109 mg/dL


(70-99) 


 156 mg/dL


(70-99)


 


Calcium Level


 8.9 mg/dL


(8.5-10.1) 


 8.5 mg/dL


(8.5-10.1)


 


Total Bilirubin


 0.3 mg/dL


(0.2-1.0) 


 0.4 mg/dL


(0.2-1.0)


 


Aspartate Amino Transf


(AST/SGOT) 19 U/L (15-37) 


 


 16 U/L (15-37) 





 


Alanine Aminotransferase


(ALT/SGPT) 23 U/L (14-59) 


 


 25 U/L (14-59) 





 


Alkaline Phosphatase


 113 U/L


() 


 100 U/L


()


 


Total Protein


 6.9 g/dL


(6.4-8.2) 


 6.5 g/dL


(6.4-8.2)


 


Albumin


 3.2 g/dL


(3.4-5.0) 


 2.9 g/dL


(3.4-5.0)


 


Albumin/Globulin Ratio 0.9 (1.0-1.7)   0.8 (1.0-1.7) 


 


Bedside Urine HCG, Qualitative


 


 Hcg negative


(Negative) 











Laboratory Tests








Test


 10/18/19


18:55 10/18/19


21:30 10/19/19


04:10


 


White Blood Count


 19.0 x10^3/uL


(4.0-11.0) 


 17.0 x10^3/uL


(4.0-11.0)


 


Red Blood Count


 5.63 x10^6/uL


(3.50-5.40) 


 5.20 x10^6/uL


(3.50-5.40)


 


Hemoglobin


 15.1 g/dL


(12.0-15.5) 


 14.1 g/dL


(12.0-15.5)


 


Hematocrit


 45.7 %


(36.0-47.0) 


 41.8 %


(36.0-47.0)


 


Mean Corpuscular Volume 81 fL ()   80 fL () 


 


Mean Corpuscular Hemoglobin 27 pg (25-35)   27 pg (25-35) 


 


Mean Corpuscular Hemoglobin


Concent 33 g/dL


(31-37) 


 34 g/dL


(31-37)


 


Red Cell Distribution Width


 14.6 %


(11.5-14.5) 


 14.8 %


(11.5-14.5)


 


Platelet Count


 458 x10^3/uL


(140-400) 


 377 x10^3/uL


(140-400)


 


Neutrophils (%) (Auto) 70 % (31-73)   95 % (31-73) 


 


Lymphocytes (%) (Auto) 22 % (24-48)   4 % (24-48) 


 


Monocytes (%) (Auto) 5 % (0-9)   1 % (0-9) 


 


Eosinophils (%) (Auto) 3 % (0-3)   0 % (0-3) 


 


Basophils (%) (Auto) 1 % (0-3)   0 % (0-3) 


 


Neutrophils # (Auto)


 13.2 x10^3/uL


(1.8-7.7) 


 16.2 x10^3/uL


(1.8-7.7)


 


Lymphocytes # (Auto)


 4.2 x10^3/uL


(1.0-4.8) 


 0.6 x10^3/uL


(1.0-4.8)


 


Monocytes # (Auto)


 0.9 x10^3/uL


(0.0-1.1) 


 0.1 x10^3/uL


(0.0-1.1)


 


Eosinophils # (Auto)


 0.5 x10^3/uL


(0.0-0.7) 


 0.0 x10^3/uL


(0.0-0.7)


 


Basophils # (Auto)


 0.1 x10^3/uL


(0.0-0.2) 


 0.0 x10^3/uL


(0.0-0.2)


 


Segmented Neutrophils % 79 % (35-66)   


 


Band Neutrophils % 2 % (0-9)   


 


Lymphocytes % 17 % (24-48)   


 


Monocytes % 1 % (0-10)   


 


Eosinophils % 1 % (0-5)   


 


Platelet Estimate


 Adequate


(ADEQUATE) 


 





 


D-Dimer (Barby)


 0.53 ug/mlFEU


(0.00-0.50) 


 





 


Sodium Level


 140 mmol/L


(136-145) 


 141 mmol/L


(136-145)


 


Potassium Level


 3.5 mmol/L


(3.5-5.1) 


 4.1 mmol/L


(3.5-5.1)


 


Chloride Level


 104 mmol/L


() 


 108 mmol/L


()


 


Carbon Dioxide Level


 24 mmol/L


(21-32) 


 20 mmol/L


(21-32)


 


Anion Gap 12 (6-14)   13 (6-14) 


 


Blood Urea Nitrogen


 7 mg/dL (7-20) 


 


 12 mg/dL


(7-20)


 


Creatinine


 1.0 mg/dL


(0.6-1.0) 


 1.1 mg/dL


(0.6-1.0)


 


Estimated GFR


(Cockcroft-Gault) 62.7 


 


 56.2 





 


BUN/Creatinine Ratio 7 (6-20)   11 (6-20) 


 


Glucose Level


 109 mg/dL


(70-99) 


 156 mg/dL


(70-99)


 


Calcium Level


 8.9 mg/dL


(8.5-10.1) 


 8.5 mg/dL


(8.5-10.1)


 


Total Bilirubin


 0.3 mg/dL


(0.2-1.0) 


 0.4 mg/dL


(0.2-1.0)


 


Aspartate Amino Transf


(AST/SGOT) 19 U/L (15-37) 


 


 16 U/L (15-37) 





 


Alanine Aminotransferase


(ALT/SGPT) 23 U/L (14-59) 


 


 25 U/L (14-59) 





 


Alkaline Phosphatase


 113 U/L


() 


 100 U/L


()


 


Total Protein


 6.9 g/dL


(6.4-8.2) 


 6.5 g/dL


(6.4-8.2)


 


Albumin


 3.2 g/dL


(3.4-5.0) 


 2.9 g/dL


(3.4-5.0)


 


Albumin/Globulin Ratio 0.9 (1.0-1.7)   0.8 (1.0-1.7) 


 


Bedside Urine HCG, Qualitative


 


 Hcg negative


(Negative) 














VTE Prophylaxis Ordered


VTE Prophylaxis Devices:  No


VTE Pharmacological Prophylaxi:  No





Assessment/Plan


Assessment/Plan


Impression:  





   ACUTE Asthma exacerbation, IMPROVED


   OBESITY


   Hypoxia, RESOLVED








ADMITTED 





D/C TODAY


PREDNISONE TAPER


Z-PACK


INHALER, ALBUTEROL Q 4 HRS 2 PUFFS PRN


AVOID SMOKE, DUST








54 MIN ADMIT/ D/C PLANNING TIME











AMPARO JEFFERSON MD          Oct 19, 2019 12:22

## 2019-10-19 NOTE — NUR
Discharge Note:



TIA MCMULLEN [address] [place] [address]



Discharge instructions and discharge home medications reviewed with Patient (in Green[place]ic) and 
a copy given (in Green[place]ic). All questions have been answered and understanding verbalized. 



The following instructions and handouts were given: Asthma, ABT&R



Discontinued lines and drains: Peripheral iv.



Patient discharged to Home or Self Care with Family Member via Ambulated.

## 2019-10-19 NOTE — EKG
[facility]

              [address] [place] [address]

Test Date:    2019-10-18               Test Time:    18:59:14

Pat Name:     TIA SAWYER    Department:   

Patient ID:   [id number]           Room:         [facility]

Gender:       F                        Technician:   

:          [id number]               Requested By: GRACE GOMEZ

Order Number: [id number]           Reading MD:     

                                 Measurements

Intervals                              Axis          

Rate:         128                      P:            27

NY:           122                      QRS:          66

QRSD:         92                       T:            -15

QT:           300                                    

QTc:          441                                    

                           Interpretive Statements

SINUS TACHYCARDIA

ST ABNORMALITY, POSSIBLE

INFERIOR SUBENDOCARDIAL INJURY

ABNORMAL ECG

No previous ECG available for comparison

## 2020-03-07 ENCOUNTER — HOSPITAL ENCOUNTER (EMERGENCY)
Dept: HOSPITAL 61 - ER | Age: 35
LOS: 1 days | Discharge: LEFT BEFORE DISCHARGE | End: 2020-03-08
Payer: SELF-PAY

## 2020-03-07 DIAGNOSIS — R07.89: ICD-10-CM

## 2020-03-07 DIAGNOSIS — Z53.21: ICD-10-CM

## 2020-03-07 DIAGNOSIS — I47.1: Primary | ICD-10-CM

## 2021-10-05 ENCOUNTER — HOSPITAL ENCOUNTER (EMERGENCY)
Dept: HOSPITAL 61 - ER | Age: 36
Discharge: HOME | End: 2021-10-05
Payer: SELF-PAY

## 2021-10-05 VITALS — DIASTOLIC BLOOD PRESSURE: 58 MMHG | SYSTOLIC BLOOD PRESSURE: 100 MMHG

## 2021-10-05 VITALS — HEIGHT: 63 IN | WEIGHT: 158.73 LBS | BODY MASS INDEX: 28.12 KG/M2

## 2021-10-05 DIAGNOSIS — J45.901: Primary | ICD-10-CM

## 2021-10-05 DIAGNOSIS — Z20.822: ICD-10-CM

## 2021-10-05 LAB
% LYMPHS: 33 % (ref 24–48)
% MONOS: 4 % (ref 0–10)
% SEGS: 54 % (ref 35–66)
ALBUMIN SERPL-MCNC: 3.5 G/DL (ref 3.4–5)
ALBUMIN/GLOB SERPL: 0.9 {RATIO} (ref 1–1.7)
ALP SERPL-CCNC: 95 U/L (ref 46–116)
ALT SERPL-CCNC: 24 U/L (ref 14–59)
ANION GAP SERPL CALC-SCNC: 11 MMOL/L (ref 6–14)
AST SERPL-CCNC: 12 U/L (ref 15–37)
BASOPHILS # BLD AUTO: 0.2 X10^3/UL (ref 0–0.2)
BASOPHILS NFR BLD AUTO: 3 % (ref 0–3)
BASOPHILS NFR BLD: 1 % (ref 0–3)
BILIRUB SERPL-MCNC: 0.4 MG/DL (ref 0.2–1)
BUN SERPL-MCNC: 11 MG/DL (ref 7–20)
BUN/CREAT SERPL: 14 (ref 6–20)
CALCIUM SERPL-MCNC: 8.8 MG/DL (ref 8.5–10.1)
CHLORIDE SERPL-SCNC: 106 MMOL/L (ref 98–107)
CO2 SERPL-SCNC: 21 MMOL/L (ref 21–32)
CREAT SERPL-MCNC: 0.8 MG/DL (ref 0.6–1)
EOSINOPHIL NFR BLD AUTO: 6 % (ref 0–5)
EOSINOPHIL NFR BLD: 1.1 X10^3/UL (ref 0–0.7)
EOSINOPHIL NFR BLD: 8 % (ref 0–3)
ERYTHROCYTE [DISTWIDTH] IN BLOOD BY AUTOMATED COUNT: 13.7 % (ref 11.5–14.5)
GFR SERPLBLD BASED ON 1.73 SQ M-ARVRAT: 80.3 ML/MIN
GLUCOSE SERPL-MCNC: 100 MG/DL (ref 70–99)
HCT VFR BLD CALC: 42.6 % (ref 36–47)
HGB BLD-MCNC: 14.8 G/DL (ref 12–15.5)
LYMPHOCYTES # BLD: 3.2 X10^3/UL (ref 1–4.8)
LYMPHOCYTES NFR BLD AUTO: 23 % (ref 24–48)
MAGNESIUM SERPL-MCNC: 2.1 MG/DL (ref 1.8–2.4)
MCH RBC QN AUTO: 29 PG (ref 25–35)
MCHC RBC AUTO-ENTMCNC: 35 G/DL (ref 31–37)
MCV RBC AUTO: 82 FL (ref 79–100)
MONO #: 0.8 X10^3/UL (ref 0–1.1)
MONOCYTES NFR BLD: 5 % (ref 0–9)
NEUT #: 8.7 X10^3/UL (ref 1.8–7.7)
NEUTROPHILS NFR BLD AUTO: 63 % (ref 31–73)
PLATELET # BLD AUTO: 383 X10^3/UL (ref 140–400)
PLATELET # BLD EST: ADEQUATE 10*3/UL
POTASSIUM SERPL-SCNC: 3.2 MMOL/L (ref 3.5–5.1)
PROT SERPL-MCNC: 7.2 G/DL (ref 6.4–8.2)
RBC # BLD AUTO: 5.18 X10^6/UL (ref 3.5–5.4)
SODIUM SERPL-SCNC: 138 MMOL/L (ref 136–145)
WBC # BLD AUTO: 13.8 X10^3/UL (ref 4–11)

## 2021-10-05 PROCEDURE — 71045 X-RAY EXAM CHEST 1 VIEW: CPT

## 2021-10-05 PROCEDURE — U0003 INFECTIOUS AGENT DETECTION BY NUCLEIC ACID (DNA OR RNA); SEVERE ACUTE RESPIRATORY SYNDROME CORONAVIRUS 2 (SARS-COV-2) (CORONAVIRUS DISEASE [COVID-19]), AMPLIFIED PROBE TECHNIQUE, MAKING USE OF HIGH THROUGHPUT TECHNOLOGIES AS DESCRIBED BY CMS-2020-01-R: HCPCS

## 2021-10-05 PROCEDURE — 83735 ASSAY OF MAGNESIUM: CPT

## 2021-10-05 PROCEDURE — 87426 SARSCOV CORONAVIRUS AG IA: CPT

## 2021-10-05 PROCEDURE — 36415 COLL VENOUS BLD VENIPUNCTURE: CPT

## 2021-10-05 PROCEDURE — 99285 EMERGENCY DEPT VISIT HI MDM: CPT

## 2021-10-05 PROCEDURE — 85007 BL SMEAR W/DIFF WBC COUNT: CPT

## 2021-10-05 PROCEDURE — 85025 COMPLETE CBC W/AUTO DIFF WBC: CPT

## 2021-10-05 PROCEDURE — 80053 COMPREHEN METABOLIC PANEL: CPT

## 2021-10-05 PROCEDURE — 94640 AIRWAY INHALATION TREATMENT: CPT

## 2021-10-05 PROCEDURE — 96374 THER/PROPH/DIAG INJ IV PUSH: CPT

## 2021-10-05 NOTE — NUR
IP:  Attempted to notify patient of negative COVID19 test result.  Voicemail message left to 
please return call at number provided.

## 2021-10-05 NOTE — PHYS DOC
Past Medical History


Past Medical History:  Asthma


Past Surgical History:  Appendectomy


Smoking Status:  Never Smoker


Alcohol Use:  None


Drug Use:  None





General Adult


EDM:


Chief Complaint:  ASTHMA





HPI:


HPI:





Patient is a 36  year old female with history of asthma, present to ER due to 

trouble breathing and wheezing started a few hours ago.  Patient has used 

inhaler at home but did not get any better so she came in for evaluation.  

Patient denies any, fever, no abdominal pain, no nausea vomiting.  Patient is 

fully vaccinated for COVID-19.  Patient did not have any chest pain.  Patient 

said for the last week she has been using her inhaler more often due to trouble 

breathing.  Denies she has some nonproductive cough and then she started having 

wheezings, you her inhaler treatment did not get better so she came in for joseph

luation.  Patient denies any sick contact at home.





Review of Systems:


Review of Systems:


Constitutional:   Denies fever or chills. []


Eyes:   Denies change in visual acuity. []


HENT:   Denies nasal congestion or sore throat. [] 


Respiratory:   Positive for cough and trouble breathing.


Cardiovascular:   Denies chest pain or edema. [] 


GI:   Denies abdominal pain, nausea, vomiting, bloody stools or diarrhea. [] 


:  Denies dysuria. [] 


Musculoskeletal:   Denies back pain or joint pain. [] 


Integument:   Denies rash. [] 


Neurologic:   Denies headache, focal weakness or sensory changes. [] 


Endocrine:   Denies polyuria or polydipsia. [] 


Lymphatic:  Denies swollen glands. [] 


Psychiatric:  Denies depression or anxiety. []





Heart Score:


C/O Chest Pain:  N/A


Risk Factors:


Risk Factors:  DM, Current or recent (<one month) smoker, HTN, HLP, family 

history of CAD, obesity.


Risk Scores:


Score 0 - 3:  2.5% MACE over next 6 weeks - Discharge Home


Score 4 - 6:  20.3% MACE over next 6 weeks - Admit for Clinical Observation


Score 7 - 10:  72.7% MACE over next 6 weeks - Early Invasive Strategies





Current Medications:





Current Medications








 Medications


  (Trade)  Dose


 Ordered  Sig/Wander  Start Time


 Stop Time Status Last Admin


Dose Admin


 


 Albuterol/


 Ipratropium


  (Duoneb)  3 ml  1X  ONCE  10/5/21 02:15


 10/5/21 02:16 UNV  





 


 Methylprednisolone


 Sodium Succinate


  (SOLU-Medrol


 125MG VIAL)  125 mg  1X  ONCE  10/5/21 02:15


 10/5/21 02:16 UNV  














Allergies:


Allergies:





Allergies








Coded Allergies Type Severity Reaction Last Updated Verified


 


  No Known Drug Allergies    14 No











Physical Exam:


PE:





Constitutional: Well developed, well nourished, no acute distress, non-toxic 

appearance. []


HENT: Normocephalic, atraumatic, bilateral external ears normal, oropharynx 

moist, no oral exudates, nose normal. []


Eyes: PERRLA, EOMI, conjunctiva normal, no discharge. [] 


Neck: Normal range of motion, no tenderness, supple, no stridor. [] 


Cardiovascular: Sinus tachycardia, no murmur []


Lungs & Thorax: Tachypnea, increased work of breathing, severe wheezing to 

auscultation


Abdomen: Bowel sounds normal, soft, no tenderness, no masses, no pulsatile 

masses. [] 


Skin: Warm, dry, no erythema, no rash. [] 


Back: No tenderness, no CVA tenderness. [] 


Extremities: No tenderness, no cyanosis, no clubbing, ROM intact, no edema. [] 


Neurologic: Alert and oriented X 3, normal motor function, normal sensory 

function, no focal deficits noted. []


Psychologic: Affect normal, judgement normal, mood normal. []





Current Patient Data:


Labs:





Laboratory Tests








Test


 10/5/21


02:20 10/5/21


02:35


 


White Blood Count 13.8 x10^3/uL  


 


Red Blood Count 5.18 x10^6/uL  


 


Hemoglobin 14.8 g/dL  


 


Hematocrit 42.6 %  


 


Mean Corpuscular Volume 82 fL  


 


Mean Corpuscular Hemoglobin 29 pg  


 


Mean Corpuscular Hemoglobin


Concent 35 g/dL 


 





 


Red Cell Distribution Width 13.7 %  


 


Platelet Count 383 x10^3/uL  


 


Neutrophils (%) (Auto) 63 %  


 


Lymphocytes (%) (Auto) 23 %  


 


Monocytes (%) (Auto) 5 %  


 


Eosinophils (%) (Auto) 8 %  


 


Basophils (%) (Auto) 1 %  


 


Neutrophils # (Auto) 8.7 x10^3/uL  


 


Lymphocytes # (Auto) 3.2 x10^3/uL  


 


Monocytes # (Auto) 0.8 x10^3/uL  


 


Eosinophils # (Auto) 1.1 x10^3/uL  


 


Basophils # (Auto) 0.2 x10^3/uL  


 


Segmented Neutrophils % 54 %  


 


Lymphocytes % 33 %  


 


Monocytes % 4 %  


 


Eosinophils % 6 %  


 


Basophils % 3 %  


 


Platelet Estimate Adequate  


 


Sodium Level 138 mmol/L  


 


Potassium Level 3.2 mmol/L  


 


Chloride Level 106 mmol/L  


 


Carbon Dioxide Level 21 mmol/L  


 


Anion Gap 11  


 


Blood Urea Nitrogen 11 mg/dL  


 


Creatinine 0.8 mg/dL  


 


Estimated GFR


(Cockcroft-Gault) 80.3 


 





 


BUN/Creatinine Ratio 14  


 


Glucose Level 100 mg/dL  


 


Calcium Level 8.8 mg/dL  


 


Magnesium Level 2.1 mg/dL  


 


Total Bilirubin 0.4 mg/dL  


 


Aspartate Amino Transf


(AST/SGOT) 12 U/L 


 





 


Alanine Aminotransferase


(ALT/SGPT) 24 U/L 


 





 


Alkaline Phosphatase 95 U/L  


 


Total Protein 7.2 g/dL  


 


Albumin 3.5 g/dL  


 


Albumin/Globulin Ratio 0.9  


 


SARS-CoV-2 Antigen (Rapid)  Negative 








Current Medications








 Medications


  (Trade)  Dose


 Ordered  Sig/Wander


 Route


 PRN Reason  Start Time


 Stop Time Status Last Admin


Dose Admin


 


 Albuterol/


 Ipratropium


  (Duoneb)  3 ml  1X  ONCE


 NEB


   10/5/21 02:15


 10/5/21 02:16 DC 10/5/21 02:15





 


 Methylprednisolone


 Sodium Succinate


  (SOLU-Medrol


 125MG VIAL)  125 mg  1X  ONCE


 IV


   10/5/21 02:15


 10/5/21 02:16 DC 10/5/21 02:31





 


 Potassium Chloride


  (Klor-Con)  40 meq  1X  ONCE


 PO


   10/5/21 03:00


 10/5/21 03:01 DC 10/5/21 03:13





 


 Albuterol/


 Ipratropium


  (Duoneb)  3 ml  1X  ONCE


 NEB


   10/5/21 04:00


 10/5/21 04:01 DC 10/5/21 04:15














EKG:


EKG:


[]





Radiology/Procedures:


Radiology/Procedures:


[][facility]


                    [address] [place] [address] [phone]


                                        


                                 IMAGING REPORT





                                     Signed





PATIENT: ITA SAWYER AACCOUNT: [id number]     MRN#: [id number]


: 1985           LOCATION: ER              AGE: 36


SEX: F                    EXAM DT: 10/05/21         ACCESSION#: [id number]


STATUS: REG ER            ORD. PHYSICIAN: CODEY RANDALL DO


REASON: soa, cough, wheezing


PROCEDURE: CHEST AP ONLY





Single view chest dated 10/5/2021 2:32 AM:





COMPARISON: None





Clinical Indication: Cough and wheezing.





Findings:





Single upright portable exam of the chest was performed. Heart size and 

mediastinal contours are within normal limits. Lungs are clear. No consolidation

 or pleural effusion. No pneumothorax.





IMPRESSION:





No acute radiographic abnormality.





Electronically signed by: Alejandro Burton MD (10/5/2021 2:32 AM) [facility] [place]














DICTATED and SIGNED BY:     ALEJANDRO BURTON MD


DATE:     10/05/21 [id number] 0





Course & Med Decision Making:


Course & Med Decision Making


Pertinent Labs and Imaging studies reviewed. (See chart for details)





Patient is a 36-year-old female with a history of asthma, presented to ER due to

 trouble breathing. Patient was given DuoNeb treatment in the ER and IV 

steroids. Chest x-ray did not show any acute pneumonia. Patient was doing much 

better. Patient will be discharged home with albuterol inhaler and prednisone. 

Patient is amenable to plan of care





Dragon Disclaimer:


Dragon Disclaimer:


This electronic medical record was generated, in whole or in part, using a voice

 recognition dictation system.





Departure


Departure


Impression:  


   Primary Impression:  


   Asthma exacerbation


Disposition:  01 HOME / SELF CARE / HOMELESS


Condition:  IMPROVED


Referrals:  


NO PCP (PCP)


Please follow up with [facility] this week.





[address], [address]


[place] [address]





Phone number: [phone]


Patient Instructions:  Asthma Attacks, Prevention, Asthma, Adult





Additional Instructions:  


Thank you for visiting our Emergency Department. We appreciate you trusting us 

with your care. If any additional problems come up don't hesitate to return to 

visit us. Please follow up with your primary care provider so they can plan 

additional care if needed and know about the problem that you had. If symptoms 

worsen come back to the Emergency Department. Any concerning symptoms that start

 such as chest pain, shortness of air, weakness or numbness on one side of the 

body, running high fevers or any other concerning symptoms return to the ER.


Scripts


Prednisone (PREDNISONE) 20 Mg Tablet


1 TAB PO DAILY for 10 Days, #10 TAB


   Prov: CODEY RANDALL DO         10/5/21 


Albuterol Sulfate (PROAIR HFA INHALER) 8.5 Gm Hfa.aer.ad


2 PUFF IH PRN Q4-6HRS PRN for wheezing for 21 Days, #1 INHALER 0 Refills


   Prov: CODEY RANDALL DO         10/5/21











CODEY RANDALL DO                 Oct 5, 2021 02:12

## 2021-10-05 NOTE — RAD
Single view chest dated 10/5/2021 2:32 AM:



COMPARISON: None



Clinical Indication: Cough and wheezing.



Findings:



Single upright portable exam of the chest was performed. Heart size and mediastinal contours are with
in normal limits. Lungs are clear. No consolidation or pleural effusion. No pneumothorax.



IMPRESSION:



No acute radiographic abnormality.



Electronically signed by: Alejandro Burton MD (10/5/2021 2:32 AM) [facility]

## 2021-10-06 NOTE — NUR
IP:  Second attempt to notify patient of negative COVID19 test result.  Voicemail message to 
please return call at number provided.